# Patient Record
Sex: MALE | Race: WHITE | Employment: FULL TIME | URBAN - METROPOLITAN AREA
[De-identification: names, ages, dates, MRNs, and addresses within clinical notes are randomized per-mention and may not be internally consistent; named-entity substitution may affect disease eponyms.]

---

## 2017-04-15 DIAGNOSIS — F17.200 TOBACCO USE DISORDER: ICD-10-CM

## 2017-04-15 DIAGNOSIS — Z13.220 LIPID SCREENING: ICD-10-CM

## 2017-04-15 DIAGNOSIS — Z12.5 SPECIAL SCREENING EXAMINATION FOR NEOPLASM OF PROSTATE: ICD-10-CM

## 2017-04-15 DIAGNOSIS — Z11.4 SCREENING FOR HIV WITHOUT PRESENCE OF RISK FACTORS: ICD-10-CM

## 2017-04-15 DIAGNOSIS — N45.1 EPIDIDYMITIS: ICD-10-CM

## 2017-04-15 DIAGNOSIS — E55.9 VITAMIN D DEFICIENCY: ICD-10-CM

## 2017-04-15 LAB
ALBUMIN SERPL-MCNC: 4.3 G/DL (ref 3.9–4.9)
ALP BLD-CCNC: 46 U/L (ref 35–104)
ALT SERPL-CCNC: 23 U/L (ref 0–41)
ANION GAP SERPL CALCULATED.3IONS-SCNC: 11 MEQ/L (ref 7–13)
AST SERPL-CCNC: 20 U/L (ref 0–40)
BASOPHILS ABSOLUTE: 0.1 K/UL (ref 0–0.2)
BASOPHILS RELATIVE PERCENT: 1.1 %
BILIRUB SERPL-MCNC: 1.3 MG/DL (ref 0–1.2)
BUN BLDV-MCNC: 16 MG/DL (ref 6–20)
CALCIUM SERPL-MCNC: 9.7 MG/DL (ref 8.6–10.2)
CHLORIDE BLD-SCNC: 103 MEQ/L (ref 98–107)
CHOLESTEROL, TOTAL: 243 MG/DL (ref 0–199)
CO2: 27 MEQ/L (ref 22–29)
CREAT SERPL-MCNC: 0.69 MG/DL (ref 0.7–1.2)
EOSINOPHILS ABSOLUTE: 0.2 K/UL (ref 0–0.7)
EOSINOPHILS RELATIVE PERCENT: 3.3 %
GFR AFRICAN AMERICAN: >60
GFR NON-AFRICAN AMERICAN: >60
GLOBULIN: 2.6 G/DL (ref 2.3–3.5)
GLUCOSE BLD-MCNC: 98 MG/DL (ref 74–109)
HCT VFR BLD CALC: 45.3 % (ref 42–52)
HDLC SERPL-MCNC: 59 MG/DL (ref 40–59)
HEMOGLOBIN: 15.3 G/DL (ref 14–18)
LDL CHOLESTEROL CALCULATED: 158 MG/DL (ref 0–129)
LYMPHOCYTES ABSOLUTE: 2.4 K/UL (ref 1–4.8)
LYMPHOCYTES RELATIVE PERCENT: 33.3 %
MCH RBC QN AUTO: 31.5 PG (ref 27–31.3)
MCHC RBC AUTO-ENTMCNC: 33.8 % (ref 33–37)
MCV RBC AUTO: 93.2 FL (ref 80–100)
MONOCYTES ABSOLUTE: 0.8 K/UL (ref 0.2–0.8)
MONOCYTES RELATIVE PERCENT: 11.2 %
NEUTROPHILS ABSOLUTE: 3.8 K/UL (ref 1.4–6.5)
NEUTROPHILS RELATIVE PERCENT: 51.1 %
PDW BLD-RTO: 13.3 % (ref 11.5–14.5)
PLATELET # BLD: 291 K/UL (ref 130–400)
POTASSIUM SERPL-SCNC: 4.4 MEQ/L (ref 3.5–5.1)
PROSTATE SPECIFIC ANTIGEN: 0.94 NG/ML
RBC # BLD: 4.86 M/UL (ref 4.7–6.1)
SODIUM BLD-SCNC: 141 MEQ/L (ref 132–144)
TOTAL PROTEIN: 6.9 G/DL (ref 6.4–8.1)
TRIGL SERPL-MCNC: 128 MG/DL (ref 0–200)
TSH REFLEX: 1.56 UIU/ML (ref 0.27–4.2)
WBC # BLD: 7.3 K/UL (ref 4.8–10.8)

## 2017-04-19 LAB
HIV-1 AND HIV-2 ANTIBODIES: NEGATIVE
VITAMIN D2 AND D3, TOTAL: 33.3 NG/ML (ref 30–80)
VITAMIN D2, 25 HYDROXY: 1 NG/ML
VITAMIN D3,25 HYDROXY: 32.3 NG/ML

## 2017-04-26 ENCOUNTER — OFFICE VISIT (OUTPATIENT)
Dept: FAMILY MEDICINE CLINIC | Age: 47
End: 2017-04-26

## 2017-04-26 VITALS
OXYGEN SATURATION: 98 % | HEIGHT: 66 IN | RESPIRATION RATE: 12 BRPM | WEIGHT: 144 LBS | HEART RATE: 95 BPM | DIASTOLIC BLOOD PRESSURE: 72 MMHG | BODY MASS INDEX: 23.14 KG/M2 | TEMPERATURE: 97.3 F | SYSTOLIC BLOOD PRESSURE: 104 MMHG

## 2017-04-26 DIAGNOSIS — Z23 NEED FOR PROPHYLACTIC VACCINATION AGAINST STREPTOCOCCUS PNEUMONIAE (PNEUMOCOCCUS): ICD-10-CM

## 2017-04-26 DIAGNOSIS — F17.200 TOBACCO USE DISORDER: ICD-10-CM

## 2017-04-26 DIAGNOSIS — E78.2 MIXED HYPERLIPIDEMIA: Primary | ICD-10-CM

## 2017-04-26 DIAGNOSIS — Z12.5 SPECIAL SCREENING FOR MALIGNANT NEOPLASM OF PROSTATE: ICD-10-CM

## 2017-04-26 PROCEDURE — 90471 IMMUNIZATION ADMIN: CPT | Performed by: FAMILY MEDICINE

## 2017-04-26 PROCEDURE — 90732 PPSV23 VACC 2 YRS+ SUBQ/IM: CPT | Performed by: FAMILY MEDICINE

## 2017-04-26 PROCEDURE — 99214 OFFICE O/P EST MOD 30 MIN: CPT | Performed by: FAMILY MEDICINE

## 2017-04-26 ASSESSMENT — ENCOUNTER SYMPTOMS
EYES NEGATIVE: 1
GASTROINTESTINAL NEGATIVE: 1
ALLERGIC/IMMUNOLOGIC NEGATIVE: 1
RESPIRATORY NEGATIVE: 1

## 2020-02-25 ENCOUNTER — OFFICE VISIT (OUTPATIENT)
Dept: FAMILY MEDICINE CLINIC | Age: 50
End: 2020-02-25
Payer: COMMERCIAL

## 2020-02-25 VITALS
BODY MASS INDEX: 21.69 KG/M2 | TEMPERATURE: 98.9 F | HEIGHT: 66 IN | WEIGHT: 135 LBS | RESPIRATION RATE: 14 BRPM | OXYGEN SATURATION: 98 % | SYSTOLIC BLOOD PRESSURE: 126 MMHG | HEART RATE: 111 BPM | DIASTOLIC BLOOD PRESSURE: 80 MMHG

## 2020-02-25 LAB
INFLUENZA A ANTIBODY: NEGATIVE
INFLUENZA B ANTIBODY: NEGATIVE

## 2020-02-25 PROCEDURE — 99213 OFFICE O/P EST LOW 20 MIN: CPT | Performed by: NURSE PRACTITIONER

## 2020-02-25 PROCEDURE — 87804 INFLUENZA ASSAY W/OPTIC: CPT | Performed by: NURSE PRACTITIONER

## 2020-02-25 SDOH — ECONOMIC STABILITY: FOOD INSECURITY: WITHIN THE PAST 12 MONTHS, THE FOOD YOU BOUGHT JUST DIDN'T LAST AND YOU DIDN'T HAVE MONEY TO GET MORE.: NEVER TRUE

## 2020-02-25 SDOH — ECONOMIC STABILITY: INCOME INSECURITY: HOW HARD IS IT FOR YOU TO PAY FOR THE VERY BASICS LIKE FOOD, HOUSING, MEDICAL CARE, AND HEATING?: NOT HARD AT ALL

## 2020-02-25 SDOH — ECONOMIC STABILITY: FOOD INSECURITY: WITHIN THE PAST 12 MONTHS, YOU WORRIED THAT YOUR FOOD WOULD RUN OUT BEFORE YOU GOT MONEY TO BUY MORE.: NEVER TRUE

## 2020-02-25 SDOH — ECONOMIC STABILITY: TRANSPORTATION INSECURITY
IN THE PAST 12 MONTHS, HAS LACK OF TRANSPORTATION KEPT YOU FROM MEETINGS, WORK, OR FROM GETTING THINGS NEEDED FOR DAILY LIVING?: NO

## 2020-02-25 SDOH — ECONOMIC STABILITY: TRANSPORTATION INSECURITY
IN THE PAST 12 MONTHS, HAS THE LACK OF TRANSPORTATION KEPT YOU FROM MEDICAL APPOINTMENTS OR FROM GETTING MEDICATIONS?: NO

## 2020-02-25 ASSESSMENT — ENCOUNTER SYMPTOMS
NAUSEA: 1
COUGH: 0
SINUS PRESSURE: 0
VOMITING: 1
ABDOMINAL PAIN: 1
WHEEZING: 0
SINUS PAIN: 0
DIARRHEA: 0
CONSTIPATION: 1
RHINORRHEA: 0
SORE THROAT: 0
SHORTNESS OF BREATH: 0

## 2020-02-25 ASSESSMENT — PATIENT HEALTH QUESTIONNAIRE - PHQ9
SUM OF ALL RESPONSES TO PHQ QUESTIONS 1-9: 0
SUM OF ALL RESPONSES TO PHQ9 QUESTIONS 1 & 2: 0
SUM OF ALL RESPONSES TO PHQ QUESTIONS 1-9: 0
1. LITTLE INTEREST OR PLEASURE IN DOING THINGS: 0
2. FEELING DOWN, DEPRESSED OR HOPELESS: 0

## 2020-02-25 NOTE — PROGRESS NOTES
Subjective  Debora Gregory III, 44 y.o. male presents today with:  Chief Complaint   Patient presents with    Abdominal Pain     Patinet states it started this morning     Nausea & Vomiting    Chills       Abdominal Pain   This is a new problem. Episode onset: last night. The problem has been gradually improving. The pain is located in the LLQ, RLQ and suprapubic region. The abdominal pain does not radiate. Associated symptoms include constipation, nausea and vomiting. Pertinent negatives include no diarrhea or fever. The pain is aggravated by movement. The pain is relieved by certain positions. Treatments tried: motrin. The treatment provided no relief. Nausea & Vomiting   Associated symptoms include abdominal pain, chills, nausea and vomiting. Pertinent negatives include no chest pain, congestion, coughing, fever or sore throat. Has not vomited since 0300 AM    Last BM was yesterday and it was normal    Patient had bowl of cereal today    Tolerating drinking fluids    No past medical history on file. No Known Allergies    No current outpatient medications on file prior to visit. No current facility-administered medications on file prior to visit. Review of Systems   Constitutional: Positive for chills. Negative for fever. HENT: Negative for congestion, ear pain, postnasal drip, rhinorrhea, sinus pressure, sinus pain and sore throat. Respiratory: Negative for cough, shortness of breath and wheezing. Cardiovascular: Negative for chest pain. Gastrointestinal: Positive for abdominal pain, constipation, nausea and vomiting. Negative for diarrhea. Objective    Vitals:    02/25/20 1629   BP: 126/80   Site: Right Upper Arm   Position: Sitting   Cuff Size: Medium Adult   Pulse: 111   Resp: 14   Temp: 98.9 °F (37.2 °C)   TempSrc: Oral   SpO2: 98%   Weight: 135 lb (61.2 kg)   Height: 5' 6\" (1.676 m)       Physical Exam  Vitals signs reviewed.    Constitutional:       General: He is not in acute distress. Appearance: He is well-developed. HENT:      Head: Normocephalic and atraumatic. Right Ear: No middle ear effusion. Tympanic membrane is not erythematous or bulging. Left Ear:  No middle ear effusion. Tympanic membrane is not erythematous or bulging. Nose:      Right Sinus: No maxillary sinus tenderness or frontal sinus tenderness. Left Sinus: No maxillary sinus tenderness or frontal sinus tenderness. Mouth/Throat:      Pharynx: No oropharyngeal exudate or posterior oropharyngeal erythema. Eyes:      General:         Right eye: No discharge. Left eye: No discharge. Conjunctiva/sclera: Conjunctivae normal.   Cardiovascular:      Rate and Rhythm: Normal rate and regular rhythm. Heart sounds: Normal heart sounds. Pulmonary:      Effort: Pulmonary effort is normal. No respiratory distress. Breath sounds: Normal breath sounds. No stridor. No decreased breath sounds, wheezing or rhonchi. Abdominal:      General: Abdomen is flat. Bowel sounds are normal.      Palpations: Abdomen is soft. Tenderness: There is abdominal tenderness (mild) in the right lower quadrant, suprapubic area and left lower quadrant. Lymphadenopathy:      Cervical: No cervical adenopathy. Skin:     General: Skin is warm and dry. Neurological:      General: No focal deficit present. POC Testing Today:   Results for POC orders placed in visit on 02/25/20   POCT Influenza A/B   Result Value Ref Range    Influenza A Ab negative     Influenza B Ab negative        Assessment & Plan     Amena Ramachandran was seen today for abdominal pain, nausea & vomiting and chills. Diagnoses and all orders for this visit:    Abdominal pain, unspecified abdominal location    Flu-like symptoms  -     POCT Influenza A/B      Procedures:  Unless otherwise noted below, none    Return if symptoms worsen or fail to improve, for follow up with PCP.     Declined bentyl, states symptoms have

## 2020-03-30 LAB
ANION GAP SERPL CALCULATED.3IONS-SCNC: 10 MEQ/L (ref 9–15)
BASOPHILS ABSOLUTE: 0.1 K/UL (ref 0–0.2)
BASOPHILS RELATIVE PERCENT: 1 %
BUN BLDV-MCNC: 8 MG/DL (ref 6–20)
C-REACTIVE PROTEIN: 3.6 MG/L (ref 0–5)
CALCIUM SERPL-MCNC: 8.6 MG/DL (ref 8.5–9.9)
CHLORIDE BLD-SCNC: 100 MEQ/L (ref 95–107)
CO2: 27 MEQ/L (ref 20–31)
CREAT SERPL-MCNC: 0.66 MG/DL (ref 0.7–1.2)
EOSINOPHILS ABSOLUTE: 0.3 K/UL (ref 0–0.7)
EOSINOPHILS RELATIVE PERCENT: 3.8 %
GFR AFRICAN AMERICAN: >60
GFR NON-AFRICAN AMERICAN: >60
GLUCOSE BLD-MCNC: 123 MG/DL (ref 70–99)
HCT VFR BLD CALC: 29.3 % (ref 42–52)
HEMOGLOBIN: 9.9 G/DL (ref 14–18)
LYMPHOCYTES ABSOLUTE: 2.3 K/UL (ref 1–4.8)
LYMPHOCYTES RELATIVE PERCENT: 32.7 %
MCH RBC QN AUTO: 32.1 PG (ref 27–31.3)
MCHC RBC AUTO-ENTMCNC: 33.7 % (ref 33–37)
MCV RBC AUTO: 95.5 FL (ref 80–100)
MONOCYTES ABSOLUTE: 1 K/UL (ref 0.2–0.8)
MONOCYTES RELATIVE PERCENT: 13.6 %
NEUTROPHILS ABSOLUTE: 3.4 K/UL (ref 1.4–6.5)
NEUTROPHILS RELATIVE PERCENT: 48.9 %
PDW BLD-RTO: 14.4 % (ref 11.5–14.5)
PLATELET # BLD: 407 K/UL (ref 130–400)
POTASSIUM SERPL-SCNC: 4.1 MEQ/L (ref 3.4–4.9)
RBC # BLD: 3.07 M/UL (ref 4.7–6.1)
SODIUM BLD-SCNC: 137 MEQ/L (ref 135–144)
WBC # BLD: 7 K/UL (ref 4.8–10.8)

## 2020-04-06 ENCOUNTER — TELEPHONE (OUTPATIENT)
Dept: INFECTIOUS DISEASES | Age: 50
End: 2020-04-06

## 2020-04-16 ENCOUNTER — TELEPHONE (OUTPATIENT)
Dept: INFECTIOUS DISEASES | Age: 50
End: 2020-04-16

## 2020-04-16 NOTE — TELEPHONE ENCOUNTER
Pharmacy asking to Confirm End date of IV Abx. Pt soc was 3/20/20 for x4 wks, Found patient was suppose to be seen-have v/v yesterday 4-15-20 and R/s to 4-22-20. Will assist with an appt for tomorrow.

## 2020-04-22 ENCOUNTER — VIRTUAL VISIT (OUTPATIENT)
Dept: INFECTIOUS DISEASES | Age: 50
End: 2020-04-22
Payer: COMMERCIAL

## 2020-04-22 ENCOUNTER — TELEPHONE (OUTPATIENT)
Dept: INFECTIOUS DISEASES | Age: 50
End: 2020-04-22

## 2020-04-22 PROCEDURE — 99214 OFFICE O/P EST MOD 30 MIN: CPT | Performed by: INTERNAL MEDICINE

## 2020-04-22 RX ORDER — LACTOBACILLUS ACIDOPH-L.BULGARICUS 1 MILLION CELL CHEWABLE TABLET 1MM CELL
1 TABLET,CHEWABLE ORAL 2 TIMES DAILY
Qty: 60 TABLET | Refills: 0 | Status: SHIPPED | OUTPATIENT
Start: 2020-04-22 | End: 2020-05-22

## 2020-04-22 RX ORDER — PANTOPRAZOLE SODIUM 40 MG/1
40 TABLET, DELAYED RELEASE ORAL
Qty: 30 TABLET | Refills: 1 | Status: SHIPPED | OUTPATIENT
Start: 2020-04-22 | End: 2020-05-22

## 2020-04-22 NOTE — TELEPHONE ENCOUNTER
Humaira Zendejas asking to Confirm End of IV Abx Tx. Advised V/v completed today and orders will be placed. Will Fax over once complete.

## 2020-04-23 LAB
HCT VFR BLD CALC: 39.3 % (ref 42–52)
HEMOGLOBIN: 12.9 G/DL (ref 14–18)

## 2020-04-24 NOTE — TELEPHONE ENCOUNTER
Virtual visit completed 4-22-20 with Dr Krystian Chaudhary.    Orders to D/c IV Abx+Picc, Plus x1 Lab order Faxed to [facility].

## 2020-05-20 LAB
HCT VFR BLD CALC: 43 % (ref 41–52)
HEMOGLOBIN: 14 G/DL (ref 13.5–17)

## 2023-04-18 ENCOUNTER — TELEPHONE (OUTPATIENT)
Dept: PRIMARY CARE | Facility: CLINIC | Age: 53
End: 2023-04-18
Payer: COMMERCIAL

## 2023-04-18 DIAGNOSIS — J34.9 SINUS PROBLEM: ICD-10-CM

## 2023-07-10 DIAGNOSIS — E78.2 MIXED HYPERLIPIDEMIA: Primary | ICD-10-CM

## 2023-07-10 PROBLEM — G47.33 OBSTRUCTIVE SLEEP APNEA: Status: ACTIVE | Noted: 2023-07-10

## 2023-07-10 PROBLEM — M47.816 DJD (DEGENERATIVE JOINT DISEASE), LUMBAR: Status: ACTIVE | Noted: 2023-07-10

## 2023-07-10 PROBLEM — J32.9 CHRONIC SINUSITIS: Status: ACTIVE | Noted: 2023-07-10

## 2023-07-10 PROBLEM — K57.30 DIVERTICULAR DISEASE OF COLON: Status: ACTIVE | Noted: 2023-07-10

## 2023-07-10 PROBLEM — E78.5 HYPERLIPIDEMIA: Status: ACTIVE | Noted: 2023-07-10

## 2023-07-10 PROBLEM — N52.9 VASCULOGENIC ERECTILE DYSFUNCTION: Status: ACTIVE | Noted: 2023-07-10

## 2023-07-10 RX ORDER — ROSUVASTATIN CALCIUM 5 MG/1
1 TABLET, COATED ORAL DAILY
COMMUNITY
Start: 2020-09-14 | End: 2023-07-10 | Stop reason: SDUPTHER

## 2023-07-10 RX ORDER — ROSUVASTATIN CALCIUM 5 MG/1
5 TABLET, COATED ORAL DAILY
Qty: 30 TABLET | Refills: 0 | Status: SHIPPED | OUTPATIENT
Start: 2023-07-10

## 2023-07-28 PROBLEM — D50.9 IRON DEFICIENCY ANEMIA: Status: RESOLVED | Noted: 2023-07-28 | Resolved: 2023-07-28

## 2023-07-28 PROBLEM — M75.52 SUBACROMIAL BURSITIS OF LEFT SHOULDER JOINT: Status: RESOLVED | Noted: 2023-07-28 | Resolved: 2023-07-28

## 2023-07-28 PROBLEM — J34.2 DNS (DEVIATED NASAL SEPTUM): Status: ACTIVE | Noted: 2023-07-28

## 2023-07-28 PROBLEM — M62.838 MUSCLE SPASM: Status: ACTIVE | Noted: 2023-07-28

## 2023-07-28 PROBLEM — K56.609 SMALL BOWEL OBSTRUCTION (MULTI): Status: RESOLVED | Noted: 2023-07-28 | Resolved: 2023-07-28

## 2023-07-28 PROBLEM — K57.20 ABSCESS OF SIGMOID COLON DUE TO DIVERTICULITIS: Status: RESOLVED | Noted: 2023-07-28 | Resolved: 2023-07-28

## 2023-07-28 PROBLEM — R20.0 NUMBNESS AND TINGLING SENSATION OF SKIN: Status: RESOLVED | Noted: 2023-07-28 | Resolved: 2023-07-28

## 2023-07-28 PROBLEM — K92.9 DIGESTIVE SYSTEM DISORDER: Status: RESOLVED | Noted: 2023-07-28 | Resolved: 2023-07-28

## 2023-07-28 PROBLEM — R20.2 NUMBNESS AND TINGLING SENSATION OF SKIN: Status: RESOLVED | Noted: 2023-07-28 | Resolved: 2023-07-28

## 2023-07-28 PROBLEM — J34.89 NASAL OBSTRUCTION: Status: ACTIVE | Noted: 2023-07-28

## 2023-07-28 PROBLEM — M54.50 LOW BACK PAIN: Status: ACTIVE | Noted: 2023-07-28

## 2023-07-28 RX ORDER — DOXYCYCLINE 100 MG/1
1 CAPSULE ORAL 2 TIMES DAILY
COMMUNITY
Start: 2023-02-09 | End: 2023-08-09 | Stop reason: ALTCHOICE

## 2023-07-28 RX ORDER — ACETAMINOPHEN 500 MG
TABLET ORAL
COMMUNITY

## 2023-07-28 RX ORDER — VITAMIN B COMPLEX
1 CAPSULE ORAL DAILY
COMMUNITY
End: 2024-06-03 | Stop reason: ALTCHOICE

## 2023-07-28 RX ORDER — IBUPROFEN 800 MG/1
1 TABLET ORAL EVERY 8 HOURS PRN
COMMUNITY
Start: 2021-05-13

## 2023-07-28 RX ORDER — ACETAMINOPHEN 500 MG
TABLET ORAL
COMMUNITY
Start: 2020-08-12 | End: 2024-06-03 | Stop reason: ALTCHOICE

## 2023-07-28 RX ORDER — CYCLOBENZAPRINE HCL 10 MG
1 TABLET ORAL 3 TIMES DAILY PRN
COMMUNITY
Start: 2021-10-21 | End: 2024-06-03 | Stop reason: ALTCHOICE

## 2023-08-09 ENCOUNTER — OFFICE VISIT (OUTPATIENT)
Dept: PRIMARY CARE | Facility: CLINIC | Age: 53
End: 2023-08-09
Payer: COMMERCIAL

## 2023-08-09 VITALS
DIASTOLIC BLOOD PRESSURE: 83 MMHG | BODY MASS INDEX: 26.33 KG/M2 | HEIGHT: 66 IN | WEIGHT: 163.8 LBS | TEMPERATURE: 98.1 F | RESPIRATION RATE: 16 BRPM | SYSTOLIC BLOOD PRESSURE: 123 MMHG | HEART RATE: 88 BPM | OXYGEN SATURATION: 96 %

## 2023-08-09 DIAGNOSIS — K40.90 NON-RECURRENT UNILATERAL INGUINAL HERNIA WITHOUT OBSTRUCTION OR GANGRENE: ICD-10-CM

## 2023-08-09 DIAGNOSIS — M79.89 MASS OF SOFT TISSUE OF HIP: Primary | ICD-10-CM

## 2023-08-09 DIAGNOSIS — J32.9 CHRONIC SINUSITIS, UNSPECIFIED LOCATION: ICD-10-CM

## 2023-08-09 PROCEDURE — 99214 OFFICE O/P EST MOD 30 MIN: CPT | Performed by: FAMILY MEDICINE

## 2023-08-09 PROCEDURE — 1036F TOBACCO NON-USER: CPT | Performed by: FAMILY MEDICINE

## 2023-08-09 RX ORDER — CLARITHROMYCIN 500 MG/1
500 TABLET, FILM COATED ORAL 2 TIMES DAILY
Qty: 28 TABLET | Refills: 0 | Status: SHIPPED | OUTPATIENT
Start: 2023-08-09 | End: 2023-08-23

## 2023-08-09 ASSESSMENT — ANXIETY QUESTIONNAIRES
4. TROUBLE RELAXING: NOT AT ALL
1. FEELING NERVOUS, ANXIOUS, OR ON EDGE: NOT AT ALL
2. NOT BEING ABLE TO STOP OR CONTROL WORRYING: NOT AT ALL
5. BEING SO RESTLESS THAT IT IS HARD TO SIT STILL: NOT AT ALL
GAD7 TOTAL SCORE: 3
IF YOU CHECKED OFF ANY PROBLEMS ON THIS QUESTIONNAIRE, HOW DIFFICULT HAVE THESE PROBLEMS MADE IT FOR YOU TO DO YOUR WORK, TAKE CARE OF THINGS AT HOME, OR GET ALONG WITH OTHER PEOPLE: NOT DIFFICULT AT ALL
6. BECOMING EASILY ANNOYED OR IRRITABLE: SEVERAL DAYS
3. WORRYING TOO MUCH ABOUT DIFFERENT THINGS: SEVERAL DAYS
7. FEELING AFRAID AS IF SOMETHING AWFUL MIGHT HAPPEN: SEVERAL DAYS

## 2023-08-09 ASSESSMENT — ENCOUNTER SYMPTOMS
SEIZURES: 0
DYSURIA: 0
COUGH: 0
SPEECH DIFFICULTY: 0
HEARTBURN: 1
BACK PAIN: 0
EYE ITCHING: 0
UNEXPECTED WEIGHT CHANGE: 0
HEMATURIA: 0
WHEEZING: 0
AGITATION: 0
CONFUSION: 0
POLYDIPSIA: 0
TREMORS: 0
BRUISES/BLEEDS EASILY: 0
CHOKING: 0
FLANK PAIN: 0
ABDOMINAL PAIN: 0
NAUSEA: 0
NERVOUS/ANXIOUS: 0
WOUND: 0
WEAKNESS: 0
FEVER: 0
SINUS PRESSURE: 1
APPETITE CHANGE: 0
SORE THROAT: 0
RHINORRHEA: 1
JOINT SWELLING: 0
ACTIVITY CHANGE: 0
DIZZINESS: 0
ARTHRALGIAS: 0
EYE PAIN: 0
CONSTIPATION: 0
EYE DISCHARGE: 0
VOMITING: 0
SINUS PAIN: 0
EYE REDNESS: 0
NUMBNESS: 0
DIARRHEA: 0
VOICE CHANGE: 0
CHEST TIGHTNESS: 0
NECK STIFFNESS: 0
HEADACHES: 1
PHOTOPHOBIA: 0
PALPITATIONS: 0
HALLUCINATIONS: 0
SLEEP DISTURBANCE: 0
ABDOMINAL DISTENTION: 0
MYALGIAS: 0
FATIGUE: 1
SHORTNESS OF BREATH: 0
LIGHT-HEADEDNESS: 0
FACIAL ASYMMETRY: 0
TROUBLE SWALLOWING: 0
DIAPHORESIS: 0
NECK PAIN: 0
BLOOD IN STOOL: 0
ADENOPATHY: 0
DYSPHORIC MOOD: 0
FREQUENCY: 0
CHILLS: 0

## 2023-08-09 ASSESSMENT — PATIENT HEALTH QUESTIONNAIRE - PHQ9
SUM OF ALL RESPONSES TO PHQ9 QUESTIONS 1 AND 2: 0
2. FEELING DOWN, DEPRESSED OR HOPELESS: NOT AT ALL
1. LITTLE INTEREST OR PLEASURE IN DOING THINGS: NOT AT ALL

## 2023-08-09 NOTE — PROGRESS NOTES
Subjective   Patient ID: Edgar Covarrubias is a 53 y.o. male who presents for Headache (Been having headaches for 3-4 months its gotten worse. He states his head at been hot then normal. Been taking 800 mg ibuprofen and Excedrin migraine   ) and Heartburn (Been going on and off for a couple months he has taken tums and nothing has helped. ).  Headache   Associated symptoms include rhinorrhea and sinus pressure. Pertinent negatives include no abdominal pain, back pain, coughing, dizziness, ear pain, eye pain, eye redness, fever, hearing loss, nausea, neck pain, numbness, photophobia, seizures, sore throat, tinnitus, vomiting or weakness.   Heartburn  He complains of heartburn. He reports no abdominal pain, no chest pain, no choking, no coughing, no nausea, no sore throat or no wheezing. Associated symptoms include fatigue.       Review of Systems   Constitutional:  Positive for fatigue. Negative for activity change, appetite change, chills, diaphoresis, fever and unexpected weight change.   HENT:  Positive for congestion, postnasal drip, rhinorrhea and sinus pressure. Negative for ear pain, hearing loss, nosebleeds, sinus pain, sneezing, sore throat, tinnitus, trouble swallowing and voice change.    Eyes:  Negative for photophobia, pain, discharge, redness, itching and visual disturbance.   Respiratory:  Negative for cough, choking, chest tightness, shortness of breath and wheezing.    Cardiovascular:  Negative for chest pain, palpitations and leg swelling.   Gastrointestinal:  Positive for heartburn. Negative for abdominal distention, abdominal pain, blood in stool, constipation, diarrhea, nausea and vomiting.   Endocrine: Negative for cold intolerance, heat intolerance, polydipsia and polyuria.   Genitourinary:  Positive for testicular pain. Negative for dysuria, flank pain, frequency, hematuria and urgency.   Musculoskeletal:  Negative for arthralgias, back pain, joint swelling, myalgias, neck pain and neck  "stiffness.   Skin:  Negative for rash and wound.   Allergic/Immunologic: Negative for immunocompromised state.   Neurological:  Positive for headaches. Negative for dizziness, tremors, seizures, syncope, facial asymmetry, speech difficulty, weakness, light-headedness and numbness.   Hematological:  Negative for adenopathy. Does not bruise/bleed easily.   Psychiatric/Behavioral:  Negative for agitation, behavioral problems, confusion, dysphoric mood, hallucinations, self-injury, sleep disturbance and suicidal ideas. The patient is not nervous/anxious.        Objective   /83 (BP Location: Right arm, Patient Position: Sitting, BP Cuff Size: Adult)   Pulse 88   Temp 36.7 °C (98.1 °F) (Temporal)   Resp 16   Ht 1.684 m (5' 6.3\")   Wt 74.3 kg (163 lb 12.8 oz)   SpO2 96%   BMI 26.20 kg/m²   Physical Exam  Constitutional:       General: He is not in acute distress.     Appearance: He is not ill-appearing or diaphoretic.   HENT:      Head: Normocephalic and atraumatic.      Right Ear: External ear normal. A middle ear effusion is present. Tympanic membrane is not perforated, erythematous, retracted or bulging.      Left Ear: External ear normal. A middle ear effusion is present. Tympanic membrane is not perforated, erythematous, retracted or bulging.      Nose: Mucosal edema, congestion and rhinorrhea present. No nasal tenderness.      Right Turbinates: Enlarged and swollen.      Left Turbinates: Enlarged.      Right Sinus: Frontal sinus tenderness present.      Left Sinus: Frontal sinus tenderness present.      Mouth/Throat:      Pharynx: Posterior oropharyngeal erythema and uvula swelling present.   Eyes:      General: Lids are normal. No scleral icterus.        Right eye: No discharge.         Left eye: No discharge.      Conjunctiva/sclera: Conjunctivae normal.   Cardiovascular:      Rate and Rhythm: Normal rate and regular rhythm.      Pulses: Normal pulses.      Heart sounds: No murmur heard.  Pulmonary:    "   Effort: Pulmonary effort is normal. No respiratory distress.      Breath sounds: No decreased breath sounds, wheezing, rhonchi or rales.   Abdominal:      General: Bowel sounds are normal. There is no distension.      Palpations: Abdomen is soft. There is no mass.      Tenderness: There is no abdominal tenderness. There is no guarding or rebound.      Hernia: A hernia is present. Hernia is present in the right inguinal area.   Genitourinary:     Penis: Normal and circumcised.       Testes:         Right: Tenderness present.         Left: Tenderness present.      Epididymis:      Right: Not enlarged. No tenderness.      Left: Enlarged. Tenderness present.          Comments: Soft tissue mass left hip  Musculoskeletal:         General: No swelling, tenderness or deformity.      Cervical back: No rigidity or tenderness.      Right lower leg: No edema.      Left lower leg: No edema.   Lymphadenopathy:      Cervical: No cervical adenopathy.      Upper Body:      Right upper body: No supraclavicular adenopathy.      Left upper body: No supraclavicular adenopathy.   Skin:     General: Skin is warm and dry.      Coloration: Skin is not jaundiced or pale.      Findings: No erythema, lesion or rash.   Neurological:      General: No focal deficit present.      Mental Status: He is alert and oriented to person, place, and time.      Sensory: No sensory deficit.      Motor: No weakness or tremor.      Coordination: Coordination normal.      Gait: Gait normal.   Psychiatric:         Mood and Affect: Mood normal. Affect is not inappropriate.         Behavior: Behavior normal.         Assessment/Plan   Problem List Items Addressed This Visit       Chronic sinusitis    Relevant Medications    clarithromycin (Biaxin) 500 mg tablet     Other Visit Diagnoses       Mass of soft tissue of hip    -  Primary    Relevant Orders    Referral to General Surgery    Non-recurrent unilateral inguinal hernia without obstruction or gangrene         Relevant Orders    Referral to General Surgery        Recommend sinus rinse at least twice daily.  Once symptoms have resolved would recommend start OTC Flonase 2 puffs each nostril daily long-term for preventative care.

## 2024-06-03 ENCOUNTER — LAB (OUTPATIENT)
Dept: LAB | Facility: LAB | Age: 54
End: 2024-06-03
Payer: COMMERCIAL

## 2024-06-03 ENCOUNTER — HOSPITAL ENCOUNTER (OUTPATIENT)
Dept: RADIOLOGY | Facility: HOSPITAL | Age: 54
Discharge: HOME | End: 2024-06-03
Payer: COMMERCIAL

## 2024-06-03 ENCOUNTER — OFFICE VISIT (OUTPATIENT)
Dept: PRIMARY CARE | Facility: CLINIC | Age: 54
End: 2024-06-03
Payer: COMMERCIAL

## 2024-06-03 VITALS
TEMPERATURE: 97.7 F | HEART RATE: 87 BPM | SYSTOLIC BLOOD PRESSURE: 135 MMHG | HEIGHT: 66 IN | OXYGEN SATURATION: 96 % | RESPIRATION RATE: 18 BRPM | BODY MASS INDEX: 26.52 KG/M2 | WEIGHT: 165 LBS | DIASTOLIC BLOOD PRESSURE: 87 MMHG

## 2024-06-03 DIAGNOSIS — R06.01 ORTHOPNEA: ICD-10-CM

## 2024-06-03 DIAGNOSIS — Z11.59 ENCOUNTER FOR HEPATITIS C SCREENING TEST FOR LOW RISK PATIENT: ICD-10-CM

## 2024-06-03 DIAGNOSIS — R40.0 DAYTIME SOMNOLENCE: ICD-10-CM

## 2024-06-03 DIAGNOSIS — Z11.4 SCREENING FOR HUMAN IMMUNODEFICIENCY VIRUS WITHOUT PRESENCE OF RISK FACTORS: ICD-10-CM

## 2024-06-03 DIAGNOSIS — Z11.4 SCREENING FOR HUMAN IMMUNODEFICIENCY VIRUS WITHOUT PRESENCE OF RISK FACTORS: Primary | ICD-10-CM

## 2024-06-03 LAB
ALBUMIN SERPL BCP-MCNC: 4.4 G/DL (ref 3.4–5)
ALP SERPL-CCNC: 33 U/L (ref 33–120)
ALT SERPL W P-5'-P-CCNC: 21 U/L (ref 10–52)
ANION GAP SERPL CALC-SCNC: 10 MMOL/L (ref 10–20)
AST SERPL W P-5'-P-CCNC: 17 U/L (ref 9–39)
BASOPHILS # BLD AUTO: 0.07 X10*3/UL (ref 0–0.1)
BASOPHILS NFR BLD AUTO: 1.2 %
BILIRUB SERPL-MCNC: 0.8 MG/DL (ref 0–1.2)
BUN SERPL-MCNC: 21 MG/DL (ref 6–23)
CALCIUM SERPL-MCNC: 9.1 MG/DL (ref 8.6–10.3)
CHLORIDE SERPL-SCNC: 101 MMOL/L (ref 98–107)
CO2 SERPL-SCNC: 31 MMOL/L (ref 21–32)
CREAT SERPL-MCNC: 0.94 MG/DL (ref 0.5–1.3)
EGFRCR SERPLBLD CKD-EPI 2021: >90 ML/MIN/1.73M*2
EOSINOPHIL # BLD AUTO: 0.11 X10*3/UL (ref 0–0.7)
EOSINOPHIL NFR BLD AUTO: 1.9 %
ERYTHROCYTE [DISTWIDTH] IN BLOOD BY AUTOMATED COUNT: 12.5 % (ref 11.5–14.5)
GLUCOSE SERPL-MCNC: 113 MG/DL (ref 74–99)
HCT VFR BLD AUTO: 45.5 % (ref 41–52)
HGB BLD-MCNC: 14.8 G/DL (ref 13.5–17.5)
IMM GRANULOCYTES # BLD AUTO: 0.02 X10*3/UL (ref 0–0.7)
IMM GRANULOCYTES NFR BLD AUTO: 0.3 % (ref 0–0.9)
LYMPHOCYTES # BLD AUTO: 2.19 X10*3/UL (ref 1.2–4.8)
LYMPHOCYTES NFR BLD AUTO: 36.9 %
MAGNESIUM SERPL-MCNC: 1.76 MG/DL (ref 1.6–2.4)
MCH RBC QN AUTO: 30.6 PG (ref 26–34)
MCHC RBC AUTO-ENTMCNC: 32.5 G/DL (ref 32–36)
MCV RBC AUTO: 94 FL (ref 80–100)
MONOCYTES # BLD AUTO: 0.61 X10*3/UL (ref 0.1–1)
MONOCYTES NFR BLD AUTO: 10.3 %
NEUTROPHILS # BLD AUTO: 2.94 X10*3/UL (ref 1.2–7.7)
NEUTROPHILS NFR BLD AUTO: 49.4 %
NRBC BLD-RTO: 0 /100 WBCS (ref 0–0)
PLATELET # BLD AUTO: 310 X10*3/UL (ref 150–450)
POTASSIUM SERPL-SCNC: 4.1 MMOL/L (ref 3.5–5.3)
PROT SERPL-MCNC: 6.9 G/DL (ref 6.4–8.2)
RBC # BLD AUTO: 4.83 X10*6/UL (ref 4.5–5.9)
SODIUM SERPL-SCNC: 138 MMOL/L (ref 136–145)
WBC # BLD AUTO: 5.9 X10*3/UL (ref 4.4–11.3)

## 2024-06-03 PROCEDURE — 83735 ASSAY OF MAGNESIUM: CPT

## 2024-06-03 PROCEDURE — 86803 HEPATITIS C AB TEST: CPT

## 2024-06-03 PROCEDURE — 87389 HIV-1 AG W/HIV-1&-2 AB AG IA: CPT

## 2024-06-03 PROCEDURE — 93000 ELECTROCARDIOGRAM COMPLETE: CPT | Performed by: FAMILY MEDICINE

## 2024-06-03 PROCEDURE — 71046 X-RAY EXAM CHEST 2 VIEWS: CPT | Performed by: RADIOLOGY

## 2024-06-03 PROCEDURE — 36415 COLL VENOUS BLD VENIPUNCTURE: CPT

## 2024-06-03 PROCEDURE — 99213 OFFICE O/P EST LOW 20 MIN: CPT | Performed by: FAMILY MEDICINE

## 2024-06-03 PROCEDURE — 80053 COMPREHEN METABOLIC PANEL: CPT

## 2024-06-03 PROCEDURE — 83880 ASSAY OF NATRIURETIC PEPTIDE: CPT

## 2024-06-03 PROCEDURE — 85025 COMPLETE CBC W/AUTO DIFF WBC: CPT

## 2024-06-03 PROCEDURE — 71046 X-RAY EXAM CHEST 2 VIEWS: CPT

## 2024-06-03 PROCEDURE — 1036F TOBACCO NON-USER: CPT | Performed by: FAMILY MEDICINE

## 2024-06-03 ASSESSMENT — ENCOUNTER SYMPTOMS
BACK PAIN: 0
HEADACHES: 0
TROUBLE SWALLOWING: 0
CHEST TIGHTNESS: 0
ORTHOPNEA: 1
HALLUCINATIONS: 0
COUGH: 0
HEMATURIA: 0
NERVOUS/ANXIOUS: 0
ABDOMINAL PAIN: 0
SPEECH DIFFICULTY: 0
SEIZURES: 0
NECK PAIN: 0
EYE ITCHING: 0
NAUSEA: 0
WHEEZING: 0
DYSPHORIC MOOD: 0
FACIAL ASYMMETRY: 0
APPETITE CHANGE: 0
CONFUSION: 0
NECK STIFFNESS: 0
PHOTOPHOBIA: 0
SORE THROAT: 0
LIGHT-HEADEDNESS: 0
EYE REDNESS: 0
JOINT SWELLING: 0
ADENOPATHY: 0
CONSTIPATION: 0
VOMITING: 0
CHOKING: 0
DIARRHEA: 0
TREMORS: 0
UNEXPECTED WEIGHT CHANGE: 0
SHORTNESS OF BREATH: 1
AGITATION: 0
WEAKNESS: 0
ABDOMINAL DISTENTION: 0
BLOOD IN STOOL: 0
FREQUENCY: 0
BRUISES/BLEEDS EASILY: 0
FATIGUE: 0
DIZZINESS: 0
ACTIVITY CHANGE: 0
FLANK PAIN: 0
NUMBNESS: 0
DIAPHORESIS: 0
POLYDIPSIA: 0
RHINORRHEA: 1
PND: 1
CHILLS: 0
EYE DISCHARGE: 0
FEVER: 0
EYE PAIN: 0
ARTHRALGIAS: 0
PALPITATIONS: 0
DYSURIA: 0
SLEEP DISTURBANCE: 0
WOUND: 0
VOICE CHANGE: 0
SINUS PRESSURE: 0
MYALGIAS: 0

## 2024-06-03 ASSESSMENT — COPD QUESTIONNAIRES: COPD: 0

## 2024-06-03 NOTE — PROGRESS NOTES
"Subjective   Patient ID: Edgar Covarrubias is a 53 y.o. male who presents for Shortness of Breath (Per pt. \"Unable to get a good enough breath\") and Fatigue.    Patient comes into the office today complaining of worsening shortness of breath, gasping for air at night it has been going on for over a year per patient but it has just been getting gradually worse over the last several months and he feels now he needs to get something done about it.  He does have chronic upper respiratory symptomatology he has seen ENT for it and he states that upper respiratory symptomatology is also getting worse along with symptoms and it seems to be worse at night when lying down better with sitting up.  He denies any issues with nausea, vomiting, diarrhea, fever, chills, shortness of breath with exertion or chest pain.    Shortness of Breath  This is a chronic problem. The current episode started more than 1 year ago. The problem occurs constantly. The problem has been gradually worsening. Associated symptoms include orthopnea, PND and rhinorrhea. Pertinent negatives include no abdominal pain, chest pain, ear pain, fever, headaches, leg swelling, neck pain, rash, sore throat, vomiting or wheezing. Nothing aggravates the symptoms. The patient has no known risk factors for DVT/PE. He has tried body position changes for the symptoms. The treatment provided mild relief. His past medical history is significant for allergies. There is no history of aspirin allergies, asthma, bronchiolitis, CAD, chronic lung disease, COPD, DVT, a heart failure, PE, pneumonia or a recent surgery.        Review of Systems   Constitutional:  Negative for activity change, appetite change, chills, diaphoresis, fatigue, fever and unexpected weight change.   HENT:  Positive for rhinorrhea. Negative for congestion, ear pain, hearing loss, nosebleeds, postnasal drip, sinus pressure, sneezing, sore throat, tinnitus, trouble swallowing and voice change.    Eyes:  " "Negative for photophobia, pain, discharge, redness, itching and visual disturbance.   Respiratory:  Positive for shortness of breath. Negative for cough, choking, chest tightness and wheezing.    Cardiovascular:  Positive for orthopnea and PND. Negative for chest pain, palpitations and leg swelling.   Gastrointestinal:  Negative for abdominal distention, abdominal pain, blood in stool, constipation, diarrhea, nausea and vomiting.   Endocrine: Negative for cold intolerance, heat intolerance, polydipsia and polyuria.   Genitourinary:  Negative for dysuria, flank pain, frequency, hematuria and urgency.   Musculoskeletal:  Negative for arthralgias, back pain, joint swelling, myalgias, neck pain and neck stiffness.   Skin:  Negative for rash and wound.   Allergic/Immunologic: Negative for immunocompromised state.   Neurological:  Negative for dizziness, tremors, seizures, syncope, facial asymmetry, speech difficulty, weakness, light-headedness, numbness and headaches.   Hematological:  Negative for adenopathy. Does not bruise/bleed easily.   Psychiatric/Behavioral:  Negative for agitation, behavioral problems, confusion, dysphoric mood, hallucinations, self-injury, sleep disturbance and suicidal ideas. The patient is not nervous/anxious.        Objective   /87 (BP Location: Right arm, Patient Position: Sitting, BP Cuff Size: Large adult)   Pulse 87   Temp 36.5 °C (97.7 °F) (Temporal)   Resp 18   Ht 1.676 m (5' 6\")   Wt 74.8 kg (165 lb)   SpO2 96%   BMI 26.63 kg/m²     Physical Exam  Constitutional:       General: He is not in acute distress.     Appearance: He is not ill-appearing or diaphoretic.   HENT:      Head: Normocephalic and atraumatic.      Right Ear: External ear normal.      Left Ear: External ear normal.      Nose: Nose normal. No rhinorrhea.   Eyes:      General: Lids are normal. No scleral icterus.        Right eye: No discharge.         Left eye: No discharge.      Conjunctiva/sclera: " Conjunctivae normal.   Cardiovascular:      Rate and Rhythm: Normal rate and regular rhythm.      Pulses: Normal pulses.      Heart sounds: No murmur heard.  Pulmonary:      Effort: Pulmonary effort is normal. No respiratory distress.      Breath sounds: No decreased breath sounds, wheezing, rhonchi or rales.   Abdominal:      General: Bowel sounds are normal. There is no distension.      Palpations: Abdomen is soft. There is no mass.      Tenderness: There is no abdominal tenderness. There is no guarding or rebound.   Musculoskeletal:         General: No swelling, tenderness or deformity.      Cervical back: No rigidity or tenderness.      Right lower leg: No edema.      Left lower leg: No edema.   Lymphadenopathy:      Cervical: No cervical adenopathy.      Upper Body:      Right upper body: No supraclavicular adenopathy.      Left upper body: No supraclavicular adenopathy.   Skin:     General: Skin is warm and dry.      Coloration: Skin is not jaundiced or pale.      Findings: No erythema, lesion or rash.   Neurological:      General: No focal deficit present.      Mental Status: He is alert and oriented to person, place, and time.      Sensory: No sensory deficit.      Motor: No weakness or tremor.      Coordination: Coordination normal.      Gait: Gait normal.   Psychiatric:         Mood and Affect: Mood normal. Affect is not inappropriate.         Behavior: Behavior normal.         Assessment/Plan   Diagnoses and all orders for this visit:  Screening for human immunodeficiency virus without presence of risk factors  -     Hepatitis C antibody; Future  Encounter for hepatitis C screening test for low risk patient  -     HIV 1/2 Antigen/Antibody Screen with Reflex to Confirmation; Future  Orthopnea  -     XR chest 2 views; Future  -     ECG 12 Lead  -     B-type natriuretic peptide; Future  -     CBC and Auto Differential; Future  -     Comprehensive Metabolic Panel; Future  -     Magnesium; Future  Daytime  somnolence  -     Home sleep apnea test (HSAT); Future  -     B-type natriuretic peptide; Future  -     CBC and Auto Differential; Future  -     Comprehensive Metabolic Panel; Future  -     Magnesium; Future      Symptomatology not likely heart failure much more likely sleep apnea or upper respiratory symptomatology that is just worsening seasonally i.e. allergies.  Will check basic chest x-ray, BNP and labs today and we will arrange for home sleep study.  Will likely need sleep specialty.  If symptomatology worsens if he develops hypoxia symptomatology worsens in severity or frequency then recommend ER.  Patient stated understanding agreement with.

## 2024-06-04 LAB
BNP SERPL-MCNC: 10 PG/ML (ref 0–99)
HCV AB SER QL: NONREACTIVE
HIV 1+2 AB+HIV1 P24 AG SERPL QL IA: NONREACTIVE

## 2024-06-17 ENCOUNTER — CLINICAL SUPPORT (OUTPATIENT)
Dept: SLEEP MEDICINE | Facility: HOSPITAL | Age: 54
End: 2024-06-17
Payer: COMMERCIAL

## 2024-06-17 DIAGNOSIS — G47.33 OBSTRUCTIVE SLEEP APNEA (ADULT) (PEDIATRIC): ICD-10-CM

## 2024-06-17 DIAGNOSIS — R40.0 DAYTIME SOMNOLENCE: ICD-10-CM

## 2024-06-17 PROCEDURE — 95806 SLEEP STUDY UNATT&RESP EFFT: CPT | Performed by: PSYCHIATRY & NEUROLOGY

## 2024-06-17 NOTE — PROGRESS NOTES
Type of Study: HOME SLEEP STUDY - NOMAD     The patient received equipment and instructions for use of the [facility] HSAT 2020 device. The patient was instructed how to apply the effort belts, cannula, thermistor. It was also explained how the Nomad and oximeter components work.  The patient was asked to record their sleep for an 8-hour period.     The patient was informed of their responsibility for the device and acknowledged this by signing the HSAT device contract. The patient was asked to return the device on 6/18/2024 between the hours of 9am to the [facility].     The patient was instructed to call 911 as usual for any medical- emergencies while at home.  The patient was also given a phone number for troubleshooting when using the device in case there were additional questions.

## 2024-06-24 DIAGNOSIS — G47.33 OBSTRUCTIVE SLEEP APNEA: ICD-10-CM

## 2024-07-08 NOTE — PROGRESS NOTES
Patient: Edgar Covarrubias  : 1970 AGE: 53 y.o. SEX:male   MRN: [id number]   Provider: ALISSON Miller     Location [facility]   Service Date: 2024     PCP: Gt Harris DO   Referred by: Chiquis Bolton, *          [facility]  New Visit Note      HISTORY OF PRESENT ILLNESS     Edgar Covarrubias is a 53 y.o. male with a h/o KALYAH and deviated septum, HLD  who presents to [facility].    2024 : NPV with concerns of KAYLAH management, found on recent sleep study. Reports loud snoring, witnessed apneas, night time awakenings, un refreshed sleep. ----> APAP 5-15 CWP ordered via [facility].    SLEEP STUDY HISTORY (personally reviewed raw data such as interpretation report, data sheet, hypnogram, and titration table if available and applicable)  -HSAT 24: showing severe KAYLAH with MARTA 39, DANIAL 12, SpO2 ariana 78% SpO2 <=88% for 34 min    SLEEP-WAKE SCHEDULE    Sleep Patterns: He does have a usual bed partner. In terms of the patient's sleep/wake cycle, he generally gets into bed at approximately 10:30 PM.  He turns off light to sleep, and his latency to sleep onset after lights out is 40min. During the night, the patient generally awakens 1-3 times nightly. These awakenings are usually brief in duration. Final wake time on weekday mornings is around 7:15 AM.    Compared to weekdays (work week), the patient's sleep schedule is  different on the weekends (off work). Usual bed time on the weekend is around 1-2 AM. Final wake time on weekend mornings is around 9 AM.     Breathing during sleep: snoring, witnessed apneas, and nasal congestion  Behaviors at night: No   Sleep paralysis: No   Hypnogogic or hypnopompic hallucinations: No   Cataplexy: No     Leg symptoms and timing:  - Sensations: Patient does not have unusual sensations in their extremities that cause an urge to move them   - Movement: Patient has not been told that  their legs kick or jerk during sleep    Daytime Symptoms:  On awakening patient reports: wake unrefreshed, feels sleepy, rare morning headaches, sometimes morning dry mouth, and hard to get out of bed  Patient report some daytime symptoms including: DAYTIME SYMPTOMS: reports sleep inertia    Sleep environment:  Preferred sleep position: back  Room is dark: Yes  Room is quiet: Yes  Room is cool: Yes  Bed comfort: good    SLEEP HABITS  Caffeine consumption: Yes, 1-2 cups/day  Alcohol consumption: Yes, occasional- drinks bourbon on the weekends  Smoking: No  Marijuana: No  Sleep aids: denies     WEIGHT: gained 30lbs in 4 years      ESS: 2  SIERRA: 7    REVIEW OF SYSTEMS     All other systems have been reviewed and are negative.    ALLERGIES     Allergies   Allergen Reactions    Bee Pollen Runny nose       MEDICATIONS     Current Outpatient Medications   Medication Sig Dispense Refill    acetaminophen (Tylenol) 500 mg tablet Take by mouth.      aspirin-acetaminophen-caffeine (Excedrin Migraine) 250-250-65 mg tablet Take 1 tablet by mouth every 6 hours if needed for headaches.      ibuprofen 800 mg tablet Take 1 tablet (800 mg) by mouth every 8 hours if needed.      rosuvastatin (Crestor) 5 mg tablet Take 1 tablet (5 mg) by mouth once daily. (Patient not taking: Reported on 6/3/2024) 30 tablet 0     No current facility-administered medications for this visit.       PAST HISTORIES     PERTINENT PAST MEDICAL HISTORY: See HPI    PERTINENT PAST SURGICAL HISTORY for Sleep Medicine:  Nose surgery when young     PERTINENT FAMILY HISTORY for Sleep Medicine:  sleep apnea on PAP- father    PERTINENT SOCIAL HISTORY:  He  reports that he has quit smoking. His smoking use included cigarettes. He has never used smokeless tobacco. He reports current alcohol use. He reports that he does not use drugs. He currently lives with spouse.    Active Problems, Allergy List, Medication List, and PMH/PSH/FH/Social Hx have been reviewed and  "reconciled in chart. No significant changes unless documented in the pertinent chart section. Updates made when necessary.     PHYSICAL EXAM     VITAL SIGNS: /89   Pulse 86   Temp 36.7 °C (98.1 °F) (Temporal)   Ht 1.676 m (5' 6\")   Wt 72.6 kg (160 lb)   SpO2 96%   BMI 25.82 kg/m²     CURRENT WEIGHT:   Vitals:    07/16/24 0943   Weight: 72.6 kg (160 lb)      PREVIOUS WEIGHTS:  Wt Readings from Last 3 Encounters:   07/16/24 72.6 kg (160 lb)   06/03/24 74.8 kg (165 lb)   09/14/23 72.6 kg (160 lb)     Physical Exam  Constitutional: Awake, not in distress  Lungs: Clear to auscultation bilateral, no cough noted  Heart: Regular rate and rhythm  Skin: Warm, no rash  Neuro: No tremors, moves all extremities  Psych: alert and oriented to time, place, and person    HEENT:   Tonsils enlargement grade 1+   Airway comments: narrow lateral walls   Tongue scalloping: no   Modified Mallampati score - 2-3    RESULTS/DATA     No results found for: \"IRON\", \"TRANSFERRIN\", \"IRONSAT\", \"TIBC\", \"FERRITIN\"    Bicarbonate   Date Value Ref Range Status   06/03/2024 31 21 - 32 mmol/L Final       ASSESSMENT/PLAN     Mr. Covarrubias is a 53 y.o. male and He was referred to the [facility] for evaluation of KAYLAH    Problem List, Orders, Assessment, Recommendations:    # KAYLAH, severe  - HSAT 6/17/24: showing severe KAYLAH with MARTA 39, DANIAL 12, SpO2 ariana 78% SpO2 <=88% for 34 min  - will start APAP 5-15 cwp via DME- [facility] (please expedite set up due to KAYLAH severity)   - Recommend starting with nasal or NP interface, may add chin strap or mouth tape   -Sleep apnea, PAP therapy education as well as the tips to be successful with PAP treatment was provided at length in clinic today. Patient verbalized understanding.  - Discussed 30-day mask guarantee and insurance requirement regarding PAP compliance and follow-up.   -Diet, exercise, and weight loss were emphasized today in clinic, as were non-supine sleep, avoiding " alcohol in the late evening, and driving or operating heavy machinery when sleepy.   - patient will follow-up in 2-3 months and bring equipment to the follow-up clinic      #HTN  BP Readings from Last 1 Encounters:   07/16/24 149/89     - doing well, asymptomatic, denies any headache, blurry vision, chest pain, palpitation, dizziness, lightheadedness, or syncopal episodes  - discussed at length the impact of untreated KAYLAH and BP control  - supportive management: low salt DASH diet (less than 2000 mg sodium intake daily), moderate intensity aerobic exercise at least 30 minutes 5 days per week, reduce stress, quit smoking, limit alcohol, lose weight, and monitor BP once daily  - continue current management and follow-up with PCP     #Overweight  BMI Readings from Last 1 Encounters:   07/16/24 25.82 kg/m²     - Encouraged healthy weight loss via diet and exercise  - Weight loss can help in the long term treatment of KAYLAH.  - Defer management to PCP     All of patient's questions were answered. He verbalizes understanding and agreement with my assessment and plan.    Disposition    Return to clinic in 3 months

## 2024-07-12 PROBLEM — G47.33 OBSTRUCTIVE SLEEP APNEA: Status: RESOLVED | Noted: 2023-07-10 | Resolved: 2024-07-12

## 2024-07-12 PROBLEM — G47.33 OSA (OBSTRUCTIVE SLEEP APNEA): Status: ACTIVE | Noted: 2024-07-12

## 2024-07-16 ENCOUNTER — TELEPHONE (OUTPATIENT)
Dept: SLEEP MEDICINE | Facility: CLINIC | Age: 54
End: 2024-07-16

## 2024-07-16 ENCOUNTER — APPOINTMENT (OUTPATIENT)
Dept: SLEEP MEDICINE | Facility: CLINIC | Age: 54
End: 2024-07-16
Payer: COMMERCIAL

## 2024-07-16 VITALS
DIASTOLIC BLOOD PRESSURE: 89 MMHG | BODY MASS INDEX: 25.71 KG/M2 | SYSTOLIC BLOOD PRESSURE: 149 MMHG | HEIGHT: 66 IN | HEART RATE: 86 BPM | OXYGEN SATURATION: 96 % | TEMPERATURE: 98.1 F | WEIGHT: 160 LBS

## 2024-07-16 DIAGNOSIS — I10 BENIGN ESSENTIAL HTN: ICD-10-CM

## 2024-07-16 DIAGNOSIS — E66.3 OVERWEIGHT (BMI 25.0-29.9): ICD-10-CM

## 2024-07-16 DIAGNOSIS — G47.33 OSA (OBSTRUCTIVE SLEEP APNEA): Primary | ICD-10-CM

## 2024-07-16 DIAGNOSIS — G47.33 OBSTRUCTIVE SLEEP APNEA: ICD-10-CM

## 2024-07-16 PROCEDURE — 1036F TOBACCO NON-USER: CPT | Performed by: NURSE PRACTITIONER

## 2024-07-16 PROCEDURE — 3077F SYST BP >= 140 MM HG: CPT | Performed by: NURSE PRACTITIONER

## 2024-07-16 PROCEDURE — 99204 OFFICE O/P NEW MOD 45 MIN: CPT | Performed by: NURSE PRACTITIONER

## 2024-07-16 PROCEDURE — G2211 COMPLEX E/M VISIT ADD ON: HCPCS | Performed by: NURSE PRACTITIONER

## 2024-07-16 PROCEDURE — 3079F DIAST BP 80-89 MM HG: CPT | Performed by: NURSE PRACTITIONER

## 2024-07-16 ASSESSMENT — SLEEP AND FATIGUE QUESTIONNAIRES
DIFFICULTY_STAYING_ASLEEP: MILD
SITING INACTIVE IN A PUBLIC PLACE LIKE A CLASS ROOM OR A MOVIE THEATER: WOULD NEVER DOZE
HOW LIKELY ARE YOU TO NOD OFF OR FALL ASLEEP WHEN YOU ARE A PASSENGER IN A CAR FOR AN HOUR WITHOUT A BREAK: WOULD NEVER DOZE
ESS-CHAD TOTAL SCORE: 2
HOW LIKELY ARE YOU TO NOD OFF OR FALL ASLEEP IN A CAR, WHILE STOPPED FOR A FEW MINUTES IN TRAFFIC: WOULD NEVER DOZE
HOW LIKELY ARE YOU TO NOD OFF OR FALL ASLEEP WHILE SITTING AND TALKING TO SOMEONE: WOULD NEVER DOZE
HOW LIKELY ARE YOU TO NOD OFF OR FALL ASLEEP WHILE WATCHING TV: SLIGHT CHANCE OF DOZING
HOW LIKELY ARE YOU TO NOD OFF OR FALL ASLEEP WHILE SITTING QUIETLY AFTER LUNCH WITHOUT ALCOHOL: WOULD NEVER DOZE
SATISFACTION_WITH_CURRENT_SLEEP_PATTERN: SATISFIED
WORRIED_DISTRESSED_DUE_TO_SLEEP: A LITTLE
DIFFICULTY_FALLING_ASLEEP: MODERATE
SLEEP_PROBLEM_NOTICEABLE_TO_OTHERS: NOT AT ALL NOTICEABLE
SLEEP_PROBLEM_INTERFERES_DAILY_ACTIVITIES: A LITTLE
HOW LIKELY ARE YOU TO NOD OFF OR FALL ASLEEP WHILE LYING DOWN TO REST IN THE AFTERNOON WHEN CIRCUMSTANCES PERMIT: SLIGHT CHANCE OF DOZING
HOW LIKELY ARE YOU TO NOD OFF OR FALL ASLEEP WHILE SITTING AND READING: WOULD NEVER DOZE
WAKING_TOO_EARLY: MILD

## 2024-07-16 ASSESSMENT — ENCOUNTER SYMPTOMS
DEPRESSION: 0
OCCASIONAL FEELINGS OF UNSTEADINESS: 0
LOSS OF SENSATION IN FEET: 0

## 2024-07-16 ASSESSMENT — PAIN SCALES - GENERAL: PAINLEVEL: 0-NO PAIN

## 2024-07-16 NOTE — PATIENT INSTRUCTIONS
[facility]  DO [address] [place] [address] [address] [place] [address]       NAME: Edgar Covarrubias   DATE: 07/16/24    Your Sleep Provider Today: ALISSON Miller  Your Primary Care Physician: Gt Harris DO       DIAGNOSIS:   1. KAYLAH (obstructive sleep apnea)        2. Obstructive sleep apnea  Referral to Adult Sleep Medicine          Thank you for coming to the Sleep Medicine Clinic today! Your sleep medicine provider today was: ALISSON Miller Below is a summary of your treatment plan, other important information, and our contact numbers:      TREATMENT PLAN     - Follow-up in 3 months.  - If not already done, sign up for 'My Chart' and send prescription requests or messages through this  - CPAP ordered via [employer]     Obstructive sleep apnea (KAYLAH): KAYLAH is a sleep disorder where your upper airway muscles relax during sleep and the airway intermittently and repetitively narrows and collapses leading to blocked airway (apnea) which, in turn, can disrupt breathing in sleep, lower oxygen levels while you sleep and cause night time wakings. Because apnea may cause higher carbon dioxide or low oxygen levels, untreated KAYLAH can lead to heart arrhythmia, elevation of blood pressure, and make it harder for the body to consolidate memory and metabolize (leading to higher blood sugars at night).   Frequent partial arousals occur during sleep resulting in sleep deprivation and daytime sleepiness. KAYLAH is associated with an increased risk of cardiovascular disease, stroke, hypertension, and insulin resistance. Moreover, untreated KAYLAH with excessive daytime sleepiness can increase the risk of motor vehicular accidents.    Some conservative strategies for KAYLAH are:   Positional therapy - Avoid sleeping on your back.   Healthy diet, exercise, and optimizing weight encouraged.   Avoid alcohol late in the evening as it can  "make sleep apnea worse.     Safety: Avoid driving and operating heavy equipment while sleepy. Drowsy driving may lead to life-threatening motor vehicle accidents.     Common treatment options for sleep apnea include weight management, positional therapy, Positive Airway Therapy (PAP) therapy, oral appliance therapy, hypoglossal nerve stimulation, and select airway surgeries.    Starting Positive Airway Pressure: You were ordered a device to wear when you sleep called PAP (Positive Airway Pressure) to treat your sleep apnea. The order will be submitted to a durable medical equipment company who will arrange setting you up with the device. They will provide all the necessary equipment and discuss use and maintenance of the device with you.     **Please bring all PAP equipment with you to follow up appointments unless told otherwise.**           Important things to keep in mind as you start PAP    Insurance will monitor your usage during the first 90 days.  You should use your PAP - \"all night, every night\", for your health. The bare minimum is to use your PAP device while sleeping = at least 4 hours per day at least 5 days per week. Otherwise, your PAP device may be reclaimed by your PAP vendor at 90 days.  There are many mask to choose from to wear with your PAP machine. If you are not comfortable with the first mask issued to you, call your DME and ask for another option to try (within the first 30 days).  Discuss with your provider if you are having issues breathing with the machine or the temperature or humidity feel uncomfortable.  Expect to have an adjustment period when you start your device. It helps to continuing wearing the machine every day for a period of time until you get more used to it. You can practice with wearing the mask alone if you need, then add in the PAP air pressure a few days later.   Reach out for help if you are struggling! The sleep medicine department can be reached at [phone]  We " encourage you to download data monitoring apps to your phone. For [employer] 10/11 - MyAir [employer]ica. For [employer] - DreamMapper. Both are available in the [employer] store for free and are a great tool to monitor your progress with your CPAP night to night.    IF YOU ARE HAVING TROUBLE GETTING USED TO YOUR PAP DEVICE    The following steps are recommended to help you get accustomed to using CPAP and improve your CPAP usage at home:    Use CPAP every night for 5 to 10 minutes while awake in the evening without fail.  Be sure to remain awake while breathing on the nasal mask for the first 1 to 2 weeks.  After 2 to 4 weeks, start using CPAP at night when you go to sleep…But be sure to take the mask off if you have not fallen asleep in 5 to 10 minutes, or if you fall asleep.  Take the mask off whenever you become aware of it or the moment you wake up.   Continue this process every night, with confidence that you will gradually become accustomed to using CPAP over time.    Be sure you are using a comfortable mask, and that you are applying it snugly (but not too tight).    Common issues with PAP machine:  Mask gets dislodged when turning to the side: Consider getting a CPAP pillow or switching to a mask with hose on top.     Dry mouth:  Your machine has built-in humidifier that heats up the air to prevent dry mouth. It can be adjusted to your comfort. You can try that first and increase setting one level one night at a time to check which setting is comfortable and effective in lessening dry mouth. If dry mouth persists despite humidity setting adjustment, may apply OTC Biotene gel over the gums at bedtime.  If Biotene gel is not effective, consider trying XEROSTOM gel from [employer].  If using nasal pillows or nasal mask, consider adding chinstrap or mouth tape to keep your mouth closed while you sleep. Also, eliminate or reduce dose of meds that can cause dry mouth if possible. Lastly, may try getting a separate  "room humidifier machine.    Airleaks: Please call DME as they may need to adjust your mask or refit you with a different kind of mask. In addition, you can ask DME for tips on getting a good mask seal and mask fit.     Difficulty tolerating the mask: Contact your DME to try a different kind of mask and/or call office to get a referral to Sleep Psychologist for CPAP desensitization. CPAP desensitization technique is a set of strategies that helps patient cope with claustrophobia and anxiety related to wearing mask. Alternatively, we can do a daytime mini-sleep study called PAP-nap trial wherein you will try on different kinds of mask and the sleep technician will try different pressure settings on CPAP and BPAP machines to see which specific pressure is tolerable and comfortable for you.     Water droplets or moisture within the hose and/or mask: This is called rain-out and it is caused by condensation of too much heated humidity on the cooler walls of the hose. If you have rain-out, turn down/decrease your humidity setting or get a heated hose. If you already have a heated hose, turn up the \"tube temperature\" of the heated hose. Alternatively, if you don't want to get a heated hose or warmer air, may wrap the CPAP hose with stockings to keep it somewhat warm. Also, you need to place the machine on the floor and lower the hose so that water won't travel upward towards your mask.     Maintaining your CPAP/BPAP device:    The humidification chamber (aka water tank or water chamber) needs to be filled with distilled water to prevent buildup of white deposits in the future. If you cannot find distilled water, you can use tap water but expect to have white deposits buildup seen after prolonged use with tap water. If you start seeing white deposits on the water chamber, you can clean it by filling it with equal parts of distilled white vinegar and water. Let the vinegar-water mixture sit for 2 hours, and then rinse it with " running tap water. Clean with soap and water then let it dry.     You should try to keep your machine clean in order to work well. Here are some tips to clean PAP supplies / accessories:    Clean the humidification chamber (aka water tank) as well as your mask and tubing at least once a week with soap and water.   Alternatively, you can fill a sink or basin with warm water and add a little mild detergent, like Ivory dish soap. Gently wipe your supplies with the soapy water to free all the oils and dirt that may have collected. Once that's done, rinse these items with clean water until the soap is gone and let them air dry. You can hang your tubing over the curtain valerie in your bathroom so that it dries.  The mask insert (part of the mask that has contact with your skin) needs to be cleaned with soap and water daily. Another option is to wipe them down with CPAP wipes or baby wipes.    You should replace your mask and tubing frequently in order to prevent bacteria buildup, machine damage, and mask seal issues. The older the mask and hose, the high likelihood that there is bacteria buildup in it especially if they are not cleaned regularly. Dirty filters damage machines because build-up of dust and contaminants can cause machine to over-heat, and in time, damage the motor of machine. Cushions lose their seal over time as most masks are made of plastic and silicone while headgear is made of neoprene. These materials will break down with age and frequent use. Here is the recommended replacement schedule for PAP supplies / accessories:    Twice a month- disposable filters and cushions for nasal mask or nasal pillows.  Once a month- cushion for full face mask  Every 3 months- mask with headgear and PAP tubing (standard or heated hose)  Every 6 months- reusable filter, water chamber, and chin strap     Other useful information:    Some people do not put water in the tank while other people prefers to put water in the tank  to prevent mouth dryness. Try to experiment to determine which is more comfortable for you.   In general, new machines have 2 years warranty on parts while health insurance allows you to have a new machine once every 5 years.     You can also go to the following EDUCATION WEBSITES for further information:   American Academy of Sleep Medicine http://sleepeducation.org  National Sleep Foundation: https://sleepfoundation.org  American Sleep Apnea Association: https://www.sleepapnea.org (for patients with sleep apnea)    Here at [facility], we wish you a restful sleep!       IMPORTANT INFORMATION     Call 911 for medical emergencies.  Our offices are generally open from Monday-Friday, 9 am - 5 pm.  If you need to get in touch with me, you may either call me/my team (number is below) or you can use [employer].  If a referral for a test, for CPAP, or for another specialist was made, and you have not heard about scheduling this within a week, please call scheduling at [phone]).  If you are unable to make your appointment for clinic or an overnight study, kindly call the office at least 48 hours in advance to cancel and reschedule.  If you are on CPAP, please bring your device's card or the device to each clinic appointment.   There are no supporting services by either the sleep doctors or their staff on weekends and Holidays, or after 5 PM on weekdays.     PRESCRIPTIONS     We require 7 days advanced notice for prescription refills. If we do not receive the request in this time, we cannot guarantee that your medication will be refilled in time.      IMPORTANT PHONE NUMBERS     [facility]: [phone]  [employer] ([employer]): [phone]  [facility] [facility] ([employer]): [phone]  [facility] ([employer]): [phone]    CONTACTING YOUR SLEEP MEDICINE PROVIDER AND SLEEP TEAM      For issues with your machine or mask interface, please call your DME provider first. [employer] stands for  "durable medical company. DME is the company who provides you the machine and/or PAP supplies / accessories.   To schedule, cancel, or reschedule SLEEP STUDY APPOINTMENTS, please call the Main Phone Line at [phone] - option 3.   To schedule, cancel, or reschedule CLINIC APPOINTMENTS, you can do it in \"[employer]\", call [phone] for [facility] office , [phone] for [place] office to speak with my on site staff, or call the Main Phone Line at [phone] - option 2  For CLINICAL QUESTIONS or MEDICATION REFILLS, please call direct line for Adult Sleep Nurses at [phone].   Lastly, you can also send a message directly to your provider through \"My Chart\", which is the email service through your  Records Account: [email or web address]     Adult Sleep Nurses (Claudine Virk RN and Glenys Jeffers RN):  For clinical questions and refilling prescriptions: [phone]  Email sleep diaries and other documents at: [email or web address]    Office locations for Ana Luisa Tee NP:    [address]   [address]  [place] [address] [phone]    [address]  [address]  [place] [address] [phone]      OUR SLEEP TESTING LOCATIONS     Our team will contact you to schedule your sleep study, however, you can contact us as follow:  Main Phone Line (scheduling only): [phone]), option 3    Sleep Testing Locations:   [place] (18 years and older): [address] [place] [address], 2nd floor   [place] (18 years and older): [address] [facility]; 4th floor  After hours line: [phone]  [facility] (18 years and older) at [place]: [id number] [place] [address]  After hours line: [phone]   [facility] at [facility] (Main campus: All ages): [facility], 6th floor. After hours line: [phone]   [place] (5 years and older; younger considered on case-by-case basis): [address]; [facility] [address], [address]. Scheduling  After hours line: [phone]       Here at [facility], we wish you a restful sleep!    Your sleep medicine provider for this visit was: LAN Miller-CNP

## 2024-09-19 ENCOUNTER — OFFICE VISIT (OUTPATIENT)
Dept: URGENT CARE | Age: 54
End: 2024-09-19
Payer: COMMERCIAL

## 2024-09-19 VITALS
HEART RATE: 90 BPM | OXYGEN SATURATION: 97 % | SYSTOLIC BLOOD PRESSURE: 170 MMHG | BODY MASS INDEX: 26.66 KG/M2 | RESPIRATION RATE: 20 BRPM | HEIGHT: 65 IN | DIASTOLIC BLOOD PRESSURE: 96 MMHG | WEIGHT: 160 LBS | TEMPERATURE: 98.5 F

## 2024-09-19 DIAGNOSIS — J01.10 ACUTE NON-RECURRENT FRONTAL SINUSITIS: Primary | ICD-10-CM

## 2024-09-19 RX ORDER — AMOXICILLIN AND CLAVULANATE POTASSIUM 875; 125 MG/1; MG/1
1 TABLET, FILM COATED ORAL 2 TIMES DAILY
Qty: 20 TABLET | Refills: 0 | Status: SHIPPED | OUTPATIENT
Start: 2024-09-19 | End: 2024-09-29

## 2024-09-19 RX ORDER — FLUTICASONE PROPIONATE 50 MCG
2 SPRAY, SUSPENSION (ML) NASAL DAILY
Qty: 16 G | Refills: 0 | Status: SHIPPED | OUTPATIENT
Start: 2024-09-19 | End: 2025-09-19

## 2024-09-19 ASSESSMENT — ENCOUNTER SYMPTOMS
PSYCHIATRIC NEGATIVE: 1
SINUS PRESSURE: 1
GASTROINTESTINAL NEGATIVE: 1
CONSTITUTIONAL NEGATIVE: 1
MUSCULOSKELETAL NEGATIVE: 1
ENDOCRINE NEGATIVE: 1
NEUROLOGICAL NEGATIVE: 1
ALLERGIC/IMMUNOLOGIC NEGATIVE: 1
PALPITATIONS: 0
RESPIRATORY NEGATIVE: 1
SINUS PAIN: 1
EYES NEGATIVE: 1
HEMATOLOGIC/LYMPHATIC NEGATIVE: 1

## 2024-09-19 NOTE — PROGRESS NOTES
Subjective   Patient ID: Edgar Covarrubias is a 54 y.o. male. They present today with a chief complaint of Cough (Green sputum x 1wk, pt was on z pac antibiotic but did not finish ), Sinus Problem, and Nasal Congestion (Drainage).    History of Present Illness  HPI  Pt presents to urgent care with c/o  nasal congestion, sinus pressure, green nasal drainage x 1 .  States he usually gets sinus infections around this time of year and this feels similar. Pt denies CP, SOB, palpitations, fevers, abd pain, n/v/d, sick contacts, recent travel.        Gets sinus infection this time of year    Past Medical History  Allergies as of 09/19/2024 - Reviewed 09/19/2024   Allergen Reaction Noted    Bee pollen Runny nose 08/09/2023       (Not in a hospital admission)       Past Medical History:   Diagnosis Date    Abscess of sigmoid colon due to diverticulitis 07/28/2023    Acute gastritis without bleeding 03/04/2020    Acute gastritis without hemorrhage, unspecified gastritis type    Anesthesia of skin     Numbness and tingling    Bursitis of left shoulder 02/19/2021    Subacromial bursitis of left shoulder joint    Bursitis of unspecified shoulder 02/19/2021    Subacromial bursitis    Digestive system disorder 07/28/2023    Disease of digestive system, unspecified     Digestive problems    Diverticulitis of large intestine with perforation and abscess without bleeding 06/03/2020    Abscess of sigmoid colon due to diverticulitis    Epididymitis 05/13/2021    Epididymitis, left    Iron deficiency anemia 07/28/2023    Left lower quadrant pain 03/04/2020    Abdominal pain, LLQ (left lower quadrant)    Meralgia paresthetica, left lower limb 05/13/2021    Meralgia paresthetica of left side    Numbness and tingling sensation of skin 07/28/2023    Other acute postprocedural pain 08/18/2020    Post-operative pain    Other shoulder lesions, unspecified shoulder 02/19/2021    Rotator cuff tendonitis    Other symptoms and signs involving the  musculoskeletal system 01/10/2021    Shoulder weakness    Pain in right shoulder 10/06/2020    Shoulder pain, right    Pain in unspecified shoulder 01/10/2021    Shoulder pain    Personal history of diseases of the blood and blood-forming organs and certain disorders involving the immune mechanism 08/12/2020    History of iron deficiency anemia    Personal history of nicotine dependence 09/14/2020    History of tobacco use disorder    Personal history of other diseases of the digestive system 08/12/2020    History of constipation    Personal history of other diseases of the digestive system 12/23/2020    History of small bowel obstruction    Personal history of other diseases of the digestive system 08/06/2020    History of diverticulitis of colon    Personal history of other endocrine, nutritional and metabolic disease 09/14/2020    History of vitamin D deficiency    Right upper quadrant pain 05/06/2020    Colicky right upper quadrant pain    Small bowel obstruction (Multi) 07/28/2023    Subacromial bursitis of left shoulder joint 07/28/2023       Past Surgical History:   Procedure Laterality Date    OTHER SURGICAL HISTORY  07/15/2020    Colostomy closure    OTHER SURGICAL HISTORY  05/06/2020    Abscess incision and drainage    OTHER SURGICAL HISTORY  11/25/2020    Abdominal surgery    OTHER SURGICAL HISTORY  03/25/2020    Colostomy    OTHER SURGICAL HISTORY  03/25/2020    Colon surgery    US ASPIRATION INJECTION INTERMEDIATE JOINT  10/5/2020    US ASPIRATION INJECTION INTERMEDIATE JOINT 10/5/2020 [facility]    US ASPIRATION INJECTION INTERMEDIATE JOINT  1/8/2021    US ASPIRATION INJECTION INTERMEDIATE JOINT 1/8/2021 [facility]        reports that he has quit smoking. His smoking use included cigarettes. He has never used smokeless tobacco. He reports current alcohol use. He reports that he does not use drugs.    Review of Systems  Review of Systems   Constitutional: Negative.    HENT:  Positive  "for congestion, sinus pressure and sinus pain.    Eyes: Negative.    Respiratory: Negative.     Cardiovascular:  Negative for chest pain and palpitations.   Gastrointestinal: Negative.    Endocrine: Negative.    Genitourinary: Negative.    Musculoskeletal: Negative.    Skin: Negative.    Allergic/Immunologic: Negative.    Neurological: Negative.    Hematological: Negative.    Psychiatric/Behavioral: Negative.     All other systems reviewed and are negative.                                 Objective    Vitals:    09/19/24 0947   BP: (!) 170/96   Pulse: 90   Resp: 20   Temp: 36.9 °C (98.5 °F)   SpO2: 97%   Weight: 72.6 kg (160 lb)   Height: 1.651 m (5' 5\")     No LMP for male patient.    Physical Exam  Vitals and nursing note reviewed.   Constitutional:       General: He is not in acute distress.     Appearance: Normal appearance. He is not ill-appearing or toxic-appearing.   HENT:      Head: Atraumatic.      Right Ear: Tympanic membrane, ear canal and external ear normal.      Left Ear: Tympanic membrane, ear canal and external ear normal.      Nose: Congestion present.      Mouth/Throat:      Mouth: Mucous membranes are moist.      Pharynx: Oropharynx is clear. No oropharyngeal exudate or posterior oropharyngeal erythema.   Eyes:      Extraocular Movements: Extraocular movements intact.      Conjunctiva/sclera: Conjunctivae normal.      Pupils: Pupils are equal, round, and reactive to light.   Cardiovascular:      Rate and Rhythm: Normal rate and regular rhythm.      Pulses: Normal pulses.      Heart sounds: Normal heart sounds.   Pulmonary:      Effort: Pulmonary effort is normal.      Breath sounds: Normal breath sounds.   Abdominal:      General: Abdomen is flat. Bowel sounds are normal.      Palpations: Abdomen is soft.      Tenderness: There is no abdominal tenderness.   Musculoskeletal:         General: Normal range of motion.      Cervical back: Normal range of motion and neck supple. No tenderness.   Skin:   "   General: Skin is warm and dry.      Capillary Refill: Capillary refill takes less than 2 seconds.   Neurological:      General: No focal deficit present.      Mental Status: He is alert and oriented to person, place, and time.   Psychiatric:         Mood and Affect: Mood normal.         Behavior: Behavior normal.         Thought Content: Thought content normal.         Procedures    Point of Care Test & Imaging Results from this visit  No results found for this visit on 09/19/24.   No results found.    Diagnostic study results (if any) were reviewed by ALISSON Bynum.    Assessment/Plan   Allergies, medications, history, and pertinent labs/EKGs/Imaging reviewed by ALISSON Bynum.     Medical Decision Making  Urgent Care Course: Pt presents to the  with rhinorrhea, cough, sinus congestion.  Patient is afebrile, hemodynamically stable.  Patient denies fever, n/v, cp, sob, ab pain, dysuria, and diarrhea.  Patient states that they normally get sinusitis this time of year and requires antibiotics.  Low suspicion for pneumonia/bronchitis given that patient's lungs are clear to auscultation bilaterally.  Given longevity of symptoms, will treat patient for sinusitis with augmentin, flonase.  Patient given sinusitis and pneumonia warning signs, prescription for augmentin, flonase and will f/u with pcp.    Prior external records reviewed: Outpatient records  Independent Hx provided by: Patient  Med Rx considered but ultimately not given: Steroids   Dx tests considered but ultimately not ordered: Covid, Influenza  Social determinant that may affects healthcare: Pharmacy closed  Pt's case/impression summarized and discussed with: Patient  Likely Dx given clinical picture: Sinusitis   Although not an exhaustive list of Differential Diagnosis (though considered), patient's HPI, PE, and other findings are not suggestive of: Sinusitis, URI, bronchitis, pneumonia, Covid-19, influenza, asthma exacerbation    Patient at time of discharge was clinically well-appearing and HDS for outpatient management. The patient and/or family was given the opportunity to ask questions prior to discharge, understood my verbal discussion of the plans for treatment, expected course, indications to return to ED, and the need for timely follow up as directed.    Condition: Stable     Orders and Diagnoses  Diagnoses and all orders for this visit:  Acute non-recurrent frontal sinusitis  -     amoxicillin-pot clavulanate (Augmentin) 875-125 mg tablet; Take 1 tablet by mouth 2 times a day for 10 days.  -     fluticasone (Flonase) 50 mcg/actuation nasal spray; Administer 2 sprays into each nostril once daily. Shake gently. Before first use, prime pump. After use, clean tip and replace cap.      Medical Admin Record      Patient disposition: Home    Electronically signed by ALISSON Bynum  10:23 AM

## 2024-10-02 ENCOUNTER — TELEPHONE (OUTPATIENT)
Dept: PULMONOLOGY | Facility: HOSPITAL | Age: 54
End: 2024-10-02
Payer: COMMERCIAL

## 2024-10-09 ENCOUNTER — APPOINTMENT (OUTPATIENT)
Dept: SURGERY | Facility: CLINIC | Age: 54
End: 2024-10-09
Payer: COMMERCIAL

## 2024-10-09 VITALS
BODY MASS INDEX: 26.63 KG/M2 | DIASTOLIC BLOOD PRESSURE: 87 MMHG | HEART RATE: 86 BPM | SYSTOLIC BLOOD PRESSURE: 128 MMHG | WEIGHT: 160 LBS

## 2024-10-09 DIAGNOSIS — R10.31 CHRONIC GROIN PAIN, RIGHT: Primary | ICD-10-CM

## 2024-10-09 DIAGNOSIS — G89.29 CHRONIC GROIN PAIN, RIGHT: Primary | ICD-10-CM

## 2024-10-09 PROCEDURE — 3079F DIAST BP 80-89 MM HG: CPT | Performed by: SURGERY

## 2024-10-09 PROCEDURE — 99213 OFFICE O/P EST LOW 20 MIN: CPT | Performed by: SURGERY

## 2024-10-09 PROCEDURE — 3074F SYST BP LT 130 MM HG: CPT | Performed by: SURGERY

## 2024-10-16 ENCOUNTER — LAB (OUTPATIENT)
Dept: LAB | Facility: LAB | Age: 54
End: 2024-10-16
Payer: COMMERCIAL

## 2024-10-16 DIAGNOSIS — R10.31 CHRONIC GROIN PAIN, RIGHT: ICD-10-CM

## 2024-10-16 DIAGNOSIS — G89.29 CHRONIC GROIN PAIN, RIGHT: ICD-10-CM

## 2024-10-16 LAB
CREAT SERPL-MCNC: 0.91 MG/DL (ref 0.5–1.3)
EGFRCR SERPLBLD CKD-EPI 2021: >90 ML/MIN/1.73M*2

## 2024-10-16 PROCEDURE — 36415 COLL VENOUS BLD VENIPUNCTURE: CPT

## 2024-10-16 PROCEDURE — 82565 ASSAY OF CREATININE: CPT

## 2024-10-18 ENCOUNTER — HOSPITAL ENCOUNTER (OUTPATIENT)
Dept: RADIOLOGY | Facility: HOSPITAL | Age: 54
Discharge: HOME | End: 2024-10-18
Payer: COMMERCIAL

## 2024-10-18 DIAGNOSIS — G89.29 CHRONIC GROIN PAIN, RIGHT: ICD-10-CM

## 2024-10-18 DIAGNOSIS — R10.31 CHRONIC GROIN PAIN, RIGHT: ICD-10-CM

## 2024-10-18 PROCEDURE — 2550000001 HC RX 255 CONTRASTS: Performed by: SURGERY

## 2024-10-18 PROCEDURE — A9698 NON-RAD CONTRAST MATERIALNOC: HCPCS | Performed by: SURGERY

## 2024-10-18 PROCEDURE — 74177 CT ABD & PELVIS W/CONTRAST: CPT

## 2024-10-24 ENCOUNTER — APPOINTMENT (OUTPATIENT)
Dept: SURGERY | Facility: CLINIC | Age: 54
End: 2024-10-24
Payer: COMMERCIAL

## 2024-10-24 VITALS — WEIGHT: 160 LBS | BODY MASS INDEX: 26.66 KG/M2 | HEIGHT: 65 IN

## 2024-10-24 DIAGNOSIS — G89.29 CHRONIC GROIN PAIN, RIGHT: Primary | ICD-10-CM

## 2024-10-24 DIAGNOSIS — R10.31 CHRONIC GROIN PAIN, RIGHT: Primary | ICD-10-CM

## 2024-10-24 PROCEDURE — 1036F TOBACCO NON-USER: CPT | Performed by: SURGERY

## 2024-10-24 PROCEDURE — 99213 OFFICE O/P EST LOW 20 MIN: CPT | Performed by: SURGERY

## 2024-10-24 PROCEDURE — 3008F BODY MASS INDEX DOCD: CPT | Performed by: SURGERY

## 2024-10-24 RX ORDER — ACETAMINOPHEN 325 MG/1
975 TABLET ORAL ONCE
OUTPATIENT
Start: 2024-10-24 | End: 2024-10-24

## 2024-10-24 RX ORDER — CEFAZOLIN SODIUM 2 G/100ML
2 INJECTION, SOLUTION INTRAVENOUS ONCE
OUTPATIENT
Start: 2024-10-24 | End: 2024-10-24

## 2024-10-24 RX ORDER — HEPARIN SODIUM 5000 [USP'U]/ML
5000 INJECTION, SOLUTION INTRAVENOUS; SUBCUTANEOUS ONCE
OUTPATIENT
Start: 2024-10-24 | End: 2024-10-24

## 2024-10-24 ASSESSMENT — ENCOUNTER SYMPTOMS: ROS GI COMMENTS: RIGHT GROIN PAIN

## 2024-10-24 NOTE — PROGRESS NOTES
Subjective   Patient ID: Edgar Covarrubias is a 54 y.o. male who presents for Follow-up (Ct scan results).  HPI  Patient returns after CAT scan.  Continues to have discomfort especially in the right groin after he sneezes also has a left hip mass  Review of Systems   Gastrointestinal:         Right groin pain   All other systems reviewed and are negative.      Objective   Physical Exam  Abdominal:      Comments: Abdomen is soft nontender moderate size right inguinal hernia reducible left side small nontender 4 x 4 centimeter lipomatous mass left hip     Physical Exam  Constitutional:       Appearance: Normal appearance.   HENT:      Head: Normocephalic and atraumatic.      Mouth/Throat:      Pharynx: Oropharynx is clear.   Eyes:      Pupils: Pupils are equal, round, and reactive to light.   Cardiovascular:      Rate and Rhythm: Normal rate and regular rhythm.   Pulmonary:      Effort: Pulmonary effort is normal.      Breath sounds: Normal breath sounds.   Abdominal:      General: Abdomen is flat. Bowel sounds are normal.      Palpations: Abdomen is soft.   Musculoskeletal:         General: Normal range of motion.      Cervical back: Normal range of motion.   Skin:     General: Skin is warm.   Neurological:      General: No focal deficit present.      Mental Status: She is alert. Mental status is at baseline.   Psychiatric:         Mood and Affect: Mood normal.       Assessment/Plan   CT abdomen pelvis w IV contrast    Result Date: 10/19/2024  Interpreted By:  Viki Flores and Meyers Emily STUDY:   CT ABDOMEN PELVIS W IV CONTRAST;  10/18/2024 9:57 am   INDICATION: Signs/Symptoms:right groin pain.   ,R10.31 Right lower quadrant pain,G89.29 Other chronic pain   COMPARISON: CT abdomen pelvis 09/07/2023, CT abdomen pelvis 11/12/2020, CT abdomen pelvis 05/15/2020   ACCESSION NUMBER(S): [id number]   ORDERING CLINICIAN: ADRYAN DELUCA   TECHNIQUE: CT of the abdomen and pelvis was performed.  Standard contiguous axial  images were obtained at 3 mm slice thickness through the abdomen and pelvis. Coronal and sagittal reconstructions at 3 mm slice thickness were performed.   75 mL Omnipaque 350 contrast administered intravenously without immediate complication. Oral contrast was also administered.   FINDINGS: Abdomen: Bilateral fat containing inguinal hernias.   Focal fatty infiltrate noted about the gallbladder fossa and to a lesser extent falciform ligament.   The liver, gallbladder, spleen, pancreas, and adrenal glands are otherwise within normal limits.   Nonobstructing right renal calculi measuring up to 0.3 cm. Otherwise, the kidneys are unremarkable in appearance. No hydroureteronephrosis.   Patulous appearance of the partially visualized distal esophagus, findings which may be seen in the setting of a sequelae of gastroesophageal reflux disease. The bowel is normal caliber, without evidence of obstruction, focal bowel wall thickening, or inflammatory process. Oral contrast is noted throughout several loops of small bowel. Unremarkable appearing anastomotic suture line noted about the sigmoid colon within the pelvis. Oral contrast is noted throughout several loops of small bowel without evidence of obstruction. Moderate colonic stool burden.   No mesenteric or retroperitoneal lymphadenopathy. Waxing and waning appearance of few prominent right lower quadrant lymph nodes measuring up to 0.9 cm, minimally increasing and decreasing size over several serial prior examinations. No ascites. Moderate atherosclerotic calcifications of the distal abdominal aorta and its branches. Otherwise, vascular structures are unremarkable.   Pelvis: Urinary bladder is unremarkable. The prostate is normal in size. No pelvic lymphadenopathy.   Bones: No acute osseous abnormality. Mild anterior wedging deformity noted about the T9 and T10 vertebral bodies, similar in appearance when compared to prior exam. Trace grade 1 retrolisthesis of L5 on S1.  Mild multilevel degenerative change of the lumbar spine.   Lung bases: Mild dependent bibasilar atelectasis. Otherwise, no focal consolidation, pleural effusion, or pneumothorax. No suspicious solid pulmonary nodule.       1.  Bilateral fat containing inguinal hernias. 2. Waxing and waning appearance of few prominent right lower quadrant lymph nodes which measure up to 0.9 cm on current examination, though demonstrate increasing and decreasing size over several serial prior examinations. Findings are nonspecific, though may be seen in the setting of mesenteric adenitis. 3. Nonobstructing right renal calculi. 4. Patulous appearance of the partially visualized distal esophagus, findings which likely represent a sequelae of GERD. 5. Additional chronic findings stable as detailed above.   I personally reviewed the images/study, and I agree with the findings as stated above by resident physician Dr. Claudine Mccormick MD. This study was interpreted at [facility], [place], [place].   MACRO: None.   Signed by: Viki Flores 10/19/2024 9:44 PM Dictation workstation:   [id number]     CT reviewed in detail with the patient and wife has significant symptoms regarding hernia recommend open repair given his previous surgeries and they consent to proceed the possibility of bleeding infection chronic groin pain reviewed with him and wife and we will also excise hip mass at the same time       Luis Ramon MD 10/24/24 4:35 PM

## 2024-10-30 ENCOUNTER — APPOINTMENT (OUTPATIENT)
Dept: SLEEP MEDICINE | Facility: CLINIC | Age: 54
End: 2024-10-30
Payer: COMMERCIAL

## 2024-10-30 VITALS
DIASTOLIC BLOOD PRESSURE: 88 MMHG | HEIGHT: 66 IN | WEIGHT: 160 LBS | HEART RATE: 87 BPM | TEMPERATURE: 97.3 F | BODY MASS INDEX: 25.71 KG/M2 | SYSTOLIC BLOOD PRESSURE: 149 MMHG | OXYGEN SATURATION: 98 %

## 2024-10-30 DIAGNOSIS — G47.33 OSA (OBSTRUCTIVE SLEEP APNEA): Primary | ICD-10-CM

## 2024-10-30 DIAGNOSIS — E66.3 OVERWEIGHT (BMI 25.0-29.9): ICD-10-CM

## 2024-10-30 DIAGNOSIS — R09.81 NASAL CONGESTION: ICD-10-CM

## 2024-10-30 DIAGNOSIS — I10 BENIGN ESSENTIAL HTN: ICD-10-CM

## 2024-10-30 PROCEDURE — 99214 OFFICE O/P EST MOD 30 MIN: CPT | Performed by: NURSE PRACTITIONER

## 2024-10-30 PROCEDURE — 3077F SYST BP >= 140 MM HG: CPT | Performed by: NURSE PRACTITIONER

## 2024-10-30 PROCEDURE — 3079F DIAST BP 80-89 MM HG: CPT | Performed by: NURSE PRACTITIONER

## 2024-10-30 PROCEDURE — 3008F BODY MASS INDEX DOCD: CPT | Performed by: NURSE PRACTITIONER

## 2024-10-30 RX ORDER — FLUTICASONE FUROATE 27.5 UG/1
2 SPRAY, METERED NASAL NIGHTLY
Qty: 10 G | Refills: 11 | Status: SHIPPED | OUTPATIENT
Start: 2024-10-30 | End: 2025-10-30

## 2024-10-30 ASSESSMENT — SLEEP AND FATIGUE QUESTIONNAIRES
HOW LIKELY ARE YOU TO NOD OFF OR FALL ASLEEP IN A CAR, WHILE STOPPED FOR A FEW MINUTES IN TRAFFIC: WOULD NEVER DOZE
HOW LIKELY ARE YOU TO NOD OFF OR FALL ASLEEP WHILE SITTING AND TALKING TO SOMEONE: WOULD NEVER DOZE
ESS-CHAD TOTAL SCORE: 4
HOW LIKELY ARE YOU TO NOD OFF OR FALL ASLEEP WHILE SITTING QUIETLY AFTER LUNCH WITHOUT ALCOHOL: WOULD NEVER DOZE
HOW LIKELY ARE YOU TO NOD OFF OR FALL ASLEEP WHEN YOU ARE A PASSENGER IN A CAR FOR AN HOUR WITHOUT A BREAK: WOULD NEVER DOZE
HOW LIKELY ARE YOU TO NOD OFF OR FALL ASLEEP WHILE SITTING AND READING: SLIGHT CHANCE OF DOZING
HOW LIKELY ARE YOU TO NOD OFF OR FALL ASLEEP WHILE LYING DOWN TO REST IN THE AFTERNOON WHEN CIRCUMSTANCES PERMIT: SLIGHT CHANCE OF DOZING
HOW LIKELY ARE YOU TO NOD OFF OR FALL ASLEEP WHILE WATCHING TV: MODERATE CHANCE OF DOZING
SITING INACTIVE IN A PUBLIC PLACE LIKE A CLASS ROOM OR A MOVIE THEATER: WOULD NEVER DOZE

## 2024-10-31 ENCOUNTER — TELEPHONE (OUTPATIENT)
Dept: SLEEP MEDICINE | Facility: HOSPITAL | Age: 54
End: 2024-10-31

## 2024-10-31 NOTE — TELEPHONE ENCOUNTER
Submitted prior authorization for Flonase Sensimist 27.5MCG/SPRAY suspension via CoverMyMeds Key: [id number]

## 2024-11-07 ENCOUNTER — PATIENT MESSAGE (OUTPATIENT)
Dept: SLEEP MEDICINE | Facility: HOSPITAL | Age: 54
End: 2024-11-07
Payer: COMMERCIAL

## 2024-11-13 ENCOUNTER — OFFICE VISIT (OUTPATIENT)
Dept: SURGERY | Facility: CLINIC | Age: 54
End: 2024-11-13
Payer: COMMERCIAL

## 2024-11-13 VITALS
OXYGEN SATURATION: 98 % | SYSTOLIC BLOOD PRESSURE: 142 MMHG | HEART RATE: 88 BPM | WEIGHT: 160 LBS | DIASTOLIC BLOOD PRESSURE: 78 MMHG | BODY MASS INDEX: 25.82 KG/M2

## 2024-11-13 DIAGNOSIS — R10.31 CHRONIC GROIN PAIN, RIGHT: Primary | ICD-10-CM

## 2024-11-13 DIAGNOSIS — G89.29 CHRONIC GROIN PAIN, RIGHT: Primary | ICD-10-CM

## 2024-11-13 PROCEDURE — 3078F DIAST BP <80 MM HG: CPT | Performed by: SURGERY

## 2024-11-13 PROCEDURE — 3077F SYST BP >= 140 MM HG: CPT | Performed by: SURGERY

## 2024-11-13 PROCEDURE — 99212 OFFICE O/P EST SF 10 MIN: CPT | Performed by: SURGERY

## 2024-11-13 ASSESSMENT — PAIN SCALES - GENERAL: PAINLEVEL_OUTOF10: 2

## 2024-11-13 NOTE — PROGRESS NOTES
Subjective   Patient ID: Edgar Covarrubias is a 54 y.o. male who presents for Advice Only.  HPI    Review of Systems    Objective   Physical Exam    Assessment/Plan            Luis Ramon MD 11/13/24 4:51 PM

## 2024-11-13 NOTE — H&P (VIEW-ONLY)
Patient wanted to discuss upcoming surgery regarding bilateral hernia repair    Discussed in detail with the patient as the left side is asymptomatic and the right side will be approached in an open fashion bilateral open incisions will take longer to heal from recommend proceeding with current plan of open repair right side and observation on the left patient agreeable

## 2024-11-20 NOTE — PREPROCEDURE INSTRUCTIONS

## 2024-12-02 ENCOUNTER — ANESTHESIA EVENT (OUTPATIENT)
Dept: OPERATING ROOM | Facility: HOSPITAL | Age: 54
End: 2024-12-02
Payer: COMMERCIAL

## 2024-12-02 ENCOUNTER — ANESTHESIA (OUTPATIENT)
Dept: OPERATING ROOM | Facility: HOSPITAL | Age: 54
End: 2024-12-02
Payer: COMMERCIAL

## 2024-12-02 ENCOUNTER — HOSPITAL ENCOUNTER (OUTPATIENT)
Facility: HOSPITAL | Age: 54
Setting detail: OUTPATIENT SURGERY
Discharge: HOME | End: 2024-12-02
Attending: SURGERY | Admitting: SURGERY
Payer: COMMERCIAL

## 2024-12-02 VITALS
RESPIRATION RATE: 16 BRPM | HEART RATE: 75 BPM | DIASTOLIC BLOOD PRESSURE: 87 MMHG | WEIGHT: 156.53 LBS | SYSTOLIC BLOOD PRESSURE: 135 MMHG | OXYGEN SATURATION: 96 % | TEMPERATURE: 97.5 F | HEIGHT: 66 IN | BODY MASS INDEX: 25.16 KG/M2

## 2024-12-02 DIAGNOSIS — R10.31 CHRONIC GROIN PAIN, RIGHT: Primary | ICD-10-CM

## 2024-12-02 DIAGNOSIS — G89.29 CHRONIC GROIN PAIN, RIGHT: Primary | ICD-10-CM

## 2024-12-02 PROCEDURE — 2500000005 HC RX 250 GENERAL PHARMACY W/O HCPCS: Performed by: SURGERY

## 2024-12-02 PROCEDURE — 2500000004 HC RX 250 GENERAL PHARMACY W/ HCPCS (ALT 636 FOR OP/ED): Performed by: STUDENT IN AN ORGANIZED HEALTH CARE EDUCATION/TRAINING PROGRAM

## 2024-12-02 PROCEDURE — 3700000001 HC GENERAL ANESTHESIA TIME - INITIAL BASE CHARGE: Performed by: SURGERY

## 2024-12-02 PROCEDURE — 7100000010 HC PHASE TWO TIME - EACH INCREMENTAL 1 MINUTE: Performed by: SURGERY

## 2024-12-02 PROCEDURE — 7100000009 HC PHASE TWO TIME - INITIAL BASE CHARGE: Performed by: SURGERY

## 2024-12-02 PROCEDURE — 11400 EXC TR-EXT B9+MARG 0.5 CM<: CPT | Performed by: SURGERY

## 2024-12-02 PROCEDURE — 3600000008 HC OR TIME - EACH INCREMENTAL 1 MINUTE - PROCEDURE LEVEL THREE: Performed by: SURGERY

## 2024-12-02 PROCEDURE — 7100000001 HC RECOVERY ROOM TIME - INITIAL BASE CHARGE: Performed by: SURGERY

## 2024-12-02 PROCEDURE — 2500000001 HC RX 250 WO HCPCS SELF ADMINISTERED DRUGS (ALT 637 FOR MEDICARE OP): Performed by: SURGERY

## 2024-12-02 PROCEDURE — 3700000002 HC GENERAL ANESTHESIA TIME - EACH INCREMENTAL 1 MINUTE: Performed by: SURGERY

## 2024-12-02 PROCEDURE — 49505 PRP I/HERN INIT REDUC >5 YR: CPT | Performed by: SURGERY

## 2024-12-02 PROCEDURE — 2500000004 HC RX 250 GENERAL PHARMACY W/ HCPCS (ALT 636 FOR OP/ED): Mod: JZ | Performed by: SURGERY

## 2024-12-02 PROCEDURE — 96372 THER/PROPH/DIAG INJ SC/IM: CPT | Performed by: SURGERY

## 2024-12-02 PROCEDURE — 2720000007 HC OR 272 NO HCPCS: Performed by: SURGERY

## 2024-12-02 PROCEDURE — 2780000003 HC OR 278 NO HCPCS: Performed by: SURGERY

## 2024-12-02 PROCEDURE — 7100000002 HC RECOVERY ROOM TIME - EACH INCREMENTAL 1 MINUTE: Performed by: SURGERY

## 2024-12-02 PROCEDURE — C1781 MESH (IMPLANTABLE): HCPCS | Performed by: SURGERY

## 2024-12-02 PROCEDURE — 3600000003 HC OR TIME - INITIAL BASE CHARGE - PROCEDURE LEVEL THREE: Performed by: SURGERY

## 2024-12-02 PROCEDURE — 2500000004 HC RX 250 GENERAL PHARMACY W/ HCPCS (ALT 636 FOR OP/ED): Performed by: SURGERY

## 2024-12-02 PROCEDURE — 88304 TISSUE EXAM BY PATHOLOGIST: CPT | Mod: TC,ELYLAB,WESLAB | Performed by: SURGERY

## 2024-12-02 DEVICE — PERFIX PLUG, LARGE (CONTENTS: 6)
Type: IMPLANTABLE DEVICE | Site: INGUINAL | Status: FUNCTIONAL
Brand: PERFIX

## 2024-12-02 RX ORDER — ACETAMINOPHEN 325 MG/1
975 TABLET ORAL ONCE
Status: COMPLETED | OUTPATIENT
Start: 2024-12-02 | End: 2024-12-02

## 2024-12-02 RX ORDER — LABETALOL HYDROCHLORIDE 5 MG/ML
5 INJECTION, SOLUTION INTRAVENOUS ONCE AS NEEDED
Status: DISCONTINUED | OUTPATIENT
Start: 2024-12-02 | End: 2024-12-02 | Stop reason: HOSPADM

## 2024-12-02 RX ORDER — MEPERIDINE HYDROCHLORIDE 25 MG/ML
12.5 INJECTION INTRAMUSCULAR; INTRAVENOUS; SUBCUTANEOUS EVERY 10 MIN PRN
Status: DISCONTINUED | OUTPATIENT
Start: 2024-12-02 | End: 2024-12-02 | Stop reason: HOSPADM

## 2024-12-02 RX ORDER — DIPHENHYDRAMINE HYDROCHLORIDE 50 MG/ML
12.5 INJECTION INTRAMUSCULAR; INTRAVENOUS ONCE AS NEEDED
Status: DISCONTINUED | OUTPATIENT
Start: 2024-12-02 | End: 2024-12-02 | Stop reason: HOSPADM

## 2024-12-02 RX ORDER — CEFAZOLIN SODIUM 2 G/100ML
2 INJECTION, SOLUTION INTRAVENOUS ONCE
Status: COMPLETED | OUTPATIENT
Start: 2024-12-02 | End: 2024-12-02

## 2024-12-02 RX ORDER — FENTANYL CITRATE 50 UG/ML
50 INJECTION, SOLUTION INTRAMUSCULAR; INTRAVENOUS EVERY 5 MIN PRN
Status: DISCONTINUED | OUTPATIENT
Start: 2024-12-02 | End: 2024-12-02 | Stop reason: HOSPADM

## 2024-12-02 RX ORDER — SODIUM CHLORIDE, SODIUM LACTATE, POTASSIUM CHLORIDE, CALCIUM CHLORIDE 600; 310; 30; 20 MG/100ML; MG/100ML; MG/100ML; MG/100ML
100 INJECTION, SOLUTION INTRAVENOUS CONTINUOUS
Status: DISCONTINUED | OUTPATIENT
Start: 2024-12-02 | End: 2024-12-02 | Stop reason: HOSPADM

## 2024-12-02 RX ORDER — MIDAZOLAM HYDROCHLORIDE 1 MG/ML
INJECTION, SOLUTION INTRAMUSCULAR; INTRAVENOUS AS NEEDED
Status: DISCONTINUED | OUTPATIENT
Start: 2024-12-02 | End: 2024-12-02

## 2024-12-02 RX ORDER — HEPARIN SODIUM 5000 [USP'U]/ML
5000 INJECTION, SOLUTION INTRAVENOUS; SUBCUTANEOUS ONCE
Status: COMPLETED | OUTPATIENT
Start: 2024-12-02 | End: 2024-12-02

## 2024-12-02 RX ORDER — PHENYLEPHRINE HCL IN 0.9% NACL 1 MG/10 ML
SYRINGE (ML) INTRAVENOUS AS NEEDED
Status: DISCONTINUED | OUTPATIENT
Start: 2024-12-02 | End: 2024-12-02

## 2024-12-02 RX ORDER — KETOROLAC TROMETHAMINE 30 MG/ML
INJECTION, SOLUTION INTRAMUSCULAR; INTRAVENOUS AS NEEDED
Status: DISCONTINUED | OUTPATIENT
Start: 2024-12-02 | End: 2024-12-02

## 2024-12-02 RX ORDER — LIDOCAINE HYDROCHLORIDE 10 MG/ML
0.1 INJECTION, SOLUTION EPIDURAL; INFILTRATION; INTRACAUDAL; PERINEURAL ONCE
Status: DISCONTINUED | OUTPATIENT
Start: 2024-12-02 | End: 2024-12-02 | Stop reason: HOSPADM

## 2024-12-02 RX ORDER — OXYCODONE HYDROCHLORIDE 5 MG/1
5 TABLET ORAL EVERY 4 HOURS PRN
Status: CANCELLED | OUTPATIENT
Start: 2024-12-02

## 2024-12-02 RX ORDER — BUPIVACAINE HCL/EPINEPHRINE 0.25-.0005
VIAL (ML) INJECTION AS NEEDED
Status: DISCONTINUED | OUTPATIENT
Start: 2024-12-02 | End: 2024-12-02 | Stop reason: HOSPADM

## 2024-12-02 RX ORDER — FENTANYL CITRATE 50 UG/ML
INJECTION, SOLUTION INTRAMUSCULAR; INTRAVENOUS AS NEEDED
Status: DISCONTINUED | OUTPATIENT
Start: 2024-12-02 | End: 2024-12-02

## 2024-12-02 RX ORDER — DROPERIDOL 2.5 MG/ML
0.62 INJECTION, SOLUTION INTRAMUSCULAR; INTRAVENOUS ONCE AS NEEDED
Status: DISCONTINUED | OUTPATIENT
Start: 2024-12-02 | End: 2024-12-02 | Stop reason: HOSPADM

## 2024-12-02 RX ORDER — ROCURONIUM BROMIDE 10 MG/ML
INJECTION, SOLUTION INTRAVENOUS AS NEEDED
Status: DISCONTINUED | OUTPATIENT
Start: 2024-12-02 | End: 2024-12-02

## 2024-12-02 RX ORDER — ONDANSETRON HYDROCHLORIDE 2 MG/ML
INJECTION, SOLUTION INTRAVENOUS AS NEEDED
Status: DISCONTINUED | OUTPATIENT
Start: 2024-12-02 | End: 2024-12-02

## 2024-12-02 RX ORDER — PROPOFOL 10 MG/ML
INJECTION, EMULSION INTRAVENOUS AS NEEDED
Status: DISCONTINUED | OUTPATIENT
Start: 2024-12-02 | End: 2024-12-02

## 2024-12-02 RX ORDER — OXYCODONE HYDROCHLORIDE 5 MG/1
5 TABLET ORAL EVERY 6 HOURS PRN
Qty: 12 TABLET | Refills: 0 | Status: SHIPPED | OUTPATIENT
Start: 2024-12-02 | End: 2024-12-09

## 2024-12-02 RX ORDER — LIDOCAINE HYDROCHLORIDE 20 MG/ML
INJECTION, SOLUTION INFILTRATION; PERINEURAL AS NEEDED
Status: DISCONTINUED | OUTPATIENT
Start: 2024-12-02 | End: 2024-12-02

## 2024-12-02 RX ORDER — SODIUM CHLORIDE 0.9 G/100ML
IRRIGANT IRRIGATION AS NEEDED
Status: DISCONTINUED | OUTPATIENT
Start: 2024-12-02 | End: 2024-12-02 | Stop reason: HOSPADM

## 2024-12-02 ASSESSMENT — PAIN - FUNCTIONAL ASSESSMENT
PAIN_FUNCTIONAL_ASSESSMENT: 0-10

## 2024-12-02 ASSESSMENT — COLUMBIA-SUICIDE SEVERITY RATING SCALE - C-SSRS
2. HAVE YOU ACTUALLY HAD ANY THOUGHTS OF KILLING YOURSELF?: NO
6. HAVE YOU EVER DONE ANYTHING, STARTED TO DO ANYTHING, OR PREPARED TO DO ANYTHING TO END YOUR LIFE?: NO
1. IN THE PAST MONTH, HAVE YOU WISHED YOU WERE DEAD OR WISHED YOU COULD GO TO SLEEP AND NOT WAKE UP?: NO

## 2024-12-02 ASSESSMENT — PAIN SCALES - GENERAL
PAIN_LEVEL: 2
PAINLEVEL_OUTOF10: 0 - NO PAIN

## 2024-12-02 ASSESSMENT — PAIN DESCRIPTION - DESCRIPTORS: DESCRIPTORS: ACHING

## 2024-12-02 NOTE — ANESTHESIA PREPROCEDURE EVALUATION
Patient: Edgar Covarrubias III    Procedure Information       Date/Time: 12/02/24 0900    Procedures:       Repair Inguinal Hernia (Right)      Excision Lesion Skin Torso (Left)    Location: [facility] / [facility]    Surgeons: Luis BOWERS MD            Relevant Problems   Cardiac   (+) Benign essential HTN   (+) Hyperlipidemia      Pulmonary   (+) KAYLAH (obstructive sleep apnea)      Musculoskeletal   (+) DJD (degenerative joint disease), lumbar      HEENT   (+) Chronic sinusitis       Clinical information reviewed:   Tobacco  Allergies  Meds   Med Hx  Surg Hx   Fam Hx  Soc Hx        NPO Detail:  No data recorded     Physical Exam    Airway  Mallampati: II     Cardiovascular   Rhythm: regular  Rate: normal     Dental    Pulmonary - normal exam     Abdominal - normal exam             Anesthesia Plan    History of general anesthesia?: yes  History of complications of general anesthesia?: no    ASA 3     general     intravenous induction   Postoperative administration of opioids is intended.  Anesthetic plan and risks discussed with patient.

## 2024-12-02 NOTE — NURSING NOTE
Patient up to void x 1; clear yellow urine observed. Discharge instructions provided and reviewed with patient and wife. Questions answered. Patient pain free since coming to [facility]. Dr. Ramon's number given for patient to call for follow up appt in 2-3 weeks.

## 2024-12-02 NOTE — DISCHARGE INSTRUCTIONS
Hernia Repair Discharge Instructions    About this topic  A hernia starts with a weak area in your belly wall. Then, the lining of your belly can push through the weak area and form a sack. Your bowels or other belly contents can get stuck in this sack. A hernia looks like a bulge or swelling on the outside of your belly and it can get bigger when you cough or strain. Surgery is often needed to treat a hernia.    What care is needed at home?  Ask your doctor what you need to do when you go home. Make sure you ask questions if you do not understand what the doctor says. This way you will know what you need to do.  Talk with your doctor about how much you can lift. Your doctor may want you to gradually return to your normal activity or they may restrict how much you can lift for a period of time.  Talk to your doctor about how to care for your cut site. Ask your doctor about:  When you should remove or change your bandages  When you may take a bath or shower. Do not soak in a bath.  When you may go back to your normal activities like work or driving  Be sure to wash your hands before touching your wound or dressing.  You may feel better in loose-fitting clothing.  Place an ice pack or a bag of frozen peas wrapped in a towel over the painful part. Never put ice right on the skin. Do not leave the ice on more than 10 to 15 minutes at a time. This may help with pain and swelling.  What follow-up care is needed?  Your doctor may ask you to make visits to the office to check on your progress. Be sure to keep these visits.  If you have stitches or staples, you will need to have them taken out. Your doctor will often want to do this in 1 to 2 weeks. If the doctor used skin glue, the glue will fall off on its own.  What drugs may be needed?  The doctor may order drugs to:  Help with pain  Prevent infection  Soften stools  Will physical activity be limited?  Be sure to walk 3 to 4 times each day. Try to walk a little longer  each day.  You may often return to work in 1 to 2 weeks. Talk with your doctor about how much you can lift.  You may need to rest for a while. Talk to your doctor if you run, work out, or play sports.  Do light household chores only, such as washing dishes or helping with meals.  What problems could happen?  Infection  Bleeding  Wound opens or has more drainage  Hernia comes back  When do I need to call the doctor?  Signs of infection. These include a fever of 100.4°F (38°C) or higher, chills, pain with passing urine, or wound that will not heal.  Signs of wound infection. These include swelling, redness, warmth around the wound; too much pain when touched; yellowish, greenish, or bloody discharge; foul smell coming from the cut site; cut site opens up.  Continuous bleeding from the cut site.  Bulging at the cut site where the surgery was done.  No bowel movement by 2 to 3 days after surgery.  Continuous vomiting or severe abdominal pain and swelling.  Pain gets worse and does not improve when drugs are taken.  Teach Back: Helping You Understand  The Teach Back Method helps you understand the information we are giving you. After you talk with the staff, tell them in your own words what you learned. This helps to make sure the staff has described each thing clearly. It also helps to explain things that may have been confusing. Before going home, make sure you can do these:  I can tell you about my surgery.  I can tell you how to care for my cut site.  I can tell you what I will do if I have swelling, redness, or warmth around my wound.  Where can I learn more?  [employer]  https://www.facs.org/~/media/files/education/patient%20ed/groin_hernia.ashx  Familydoctor.org  https://familydoctor.org/condition/hernia/  [employer]  http://kidshealth.org/teen/sexual_health/guys/hernias.html  [facility] Choices  http://www.nhs.uk/conditions/hernia/pages/introduction.aspx  Last Reviewed Date  2023-03-03    General  Anesthesia Discharge Instructions    About this topic  You may need general anesthesia if you need to be asleep during a procedure. Your doctor will use drugs to block the signals that go from your nerves to your brain. Doctors give general anesthesia during a surgery or procedure to:  Allow you to sleep  Help your body be still  Relax your muscles  Help you to relax and be pain free  Keep you from remembering the surgery  Let the doctor manage your airway, breathing, and blood flow  The doctor or nurse anesthetist gives general anesthesia by a shot into your vein. Sometimes, you may breathe in a gas through a mask placed over your face.  What care is needed at home?  Ask your doctor what you need to do when you go home. Make sure you ask questions if you do not understand what the doctor says.  Your doctor may give you drugs to prevent or treat an upset stomach from the anesthetic. Take them as ordered.  If your throat is sore, suck on ice chips or popsicles to ease throat pain.  Put 2 to 3 pillows under your head and back when you lie down to help you breathe easier.  For the first 24 to 48 hours:  Do not operate heavy or dangerous machinery.  Do not make major decisions or sign important papers. You may not be able to think clearly.  Avoid beer, wine, or mixed drinks.  You are at a higher risk of falling for at least 24 hours after general anesthesia.  Take extra care when you get up.  Do not change positions quickly.  Do not rush when you need to go to the bathroom or to answer the phone.  Ask for help if you feel unsteady when you try to walk.  Wear shoes with non-slip soles and low heels.  What follow-up care is needed?  Your doctor may ask you to come back to the office to check on your progress. Be sure to keep these visits.  If you have stitches that do not dissolve or staples, you will need to have them removed. Your doctor will want to do this in 1 to 2 weeks. If the doctor used skin glue, the glue will  fall off on its own.  What drugs may be needed?  The doctor may order drugs to:  Help with pain  Treat an upset stomach or throwing up  Will physical activity be limited?  You will not be allowed to drive right away after the procedure. Ask a family member or a friend to drive you home.  Avoid trying to get out of bed without help until you are sure of your balance.  You may have to limit your activity. Talk to your doctor about if you need to limit how much you lift or limit exercise after your procedure.  What changes to diet are needed?  Start with a light diet when you are fully awake. This includes things that are easy to swallow like soups, pudding, jello, toast, and eggs. Slowly progress to your normal diet.  What problems could happen?  Low blood pressure  Breathing problems  Upset stomach or throwing up  Dizziness  Blood clots  Infection  When do I need to call the doctor?  Trouble breathing  Upset stomach or throwing up more than 3 times in the next 2 days  Dizziness  Teach Back: Helping You Understand  The Teach Back Method helps you understand the information we are giving you. After you talk with the staff, tell them in your own words what you learned. This helps to make sure the staff has described each thing clearly. It also helps to explain things that may have been confusing. Before going home, make sure you can do these:  I can tell you about my procedure.  I can tell you if I need to follow up with my doctor.  I can tell you what is good for me to eat and drink the next day.  I can tell you what I would do if I have trouble breathing, an upset stomach, or dizziness.  Where can I learn more?  [employer]  https://www.nigms.nih.gov/education/pages/factsheet_Anesthesia.aspx  [facility] Choices  http://www.nhs.uk/conditions/Anaesthetic-general/Pages/Definition.aspx  Last Reviewed Date  2020-04-22  Physician phone list provided

## 2024-12-02 NOTE — OP NOTE
Repair Inguinal Hernia (R), Excision Lesion Skin Torso (L) Operative Note     Date: 2024  OR Location: [facility]    Name: Edgar Covarrubias III, : 1970, Age: 54 y.o., MRN: [id number], Sex: male    Diagnosis  Pre-op Diagnosis      * Chronic groin pain, right [R10.31, G89.29] Post-op Diagnosis     * Chronic groin pain, right [R10.31, G89.29]     Procedures  Repair Inguinal Hernia  78372 - IN RPR 1ST INGUN HRNA AGE 5 YRS/> REDUCIBLE    Excision Lesion Skin Torso  80462 - IN EXC B9 LESION MRGN XCP SK TG T/A/L 0.5 CM/<      Surgeons      * Luis Ramon V - Primary    Resident/Fellow/Other Assistant:  Surgeons and Role:     * Dorina Peace PA-C - Assisting    Staff:   Circulator: Ayo Colmenares Person: Peggy Jacome Circulator: Beena    Anesthesia Staff: Anesthesiologist: Aristides Ni DO; Demario Ruiz DO    Procedure Summary  Anesthesia: General  ASA: III  Estimated Blood Loss: Minimal mL  Intra-op Medications:   Administrations occurring from 0900 to 1045 on 24:   Medication Name Total Dose   dexAMETHasone (Decadron) 4 mg/mL 4 mg   fentaNYL PF 0.05 mg/mL 100 mcg   lactated Ringer's infusion Cannot be calculated   lidocaine (Xylocaine) injection 2 % 60 mg   midazolam (Versed) 1 mg/1 mL 2 mg   phenylephrine 100 mcg/mL syringe 10 mL (prefilled) 200 mcg   propofol (Diprivan) injection 10 mg/mL 200 mg   rocuronium (ZeMuron) 50 mg/5 mL injection 60 mg   ceFAZolin (Ancef) 2 g in dextrose (iso)  mL 2 g              Anesthesia Record               Intraprocedure I/O Totals          Intake    ceFAZolin (Ancef) 2 g in dextrose (iso)  mL 100.00 mL    Total Intake 100 mL          Specimen:   ID Type Source Tests Collected by Time   1 : LEFT HIP MASS Tissue SKIN EXCISION SURGICAL PATHOLOGY EXAM Luis BOWERS MD 2024 1126                 Drains and/or Catheters: * None in log *    Tourniquet Times:         Implants:  Implants       Type Name Action Serial No.      Implant PATCH, PLUG, KNITTED,  PERFIX, LARGE, 1.6 X 1.9 IN, POLYPROPYLENE - [id number] Implanted               Findings: Large preperitoneal fat pad, left hip mass consistent with lipoma    Indications: Edgar Covarrubias III is an 54 y.o. male who is having surgery for Chronic groin pain, right [R10.31, G89.29].     The patient was seen in the preoperative area. The risks, benefits, complications, treatment options, non-operative alternatives, expected recovery and outcomes were discussed with the patient. The possibilities of reaction to medication, pulmonary aspiration, injury to surrounding structures, bleeding, recurrent infection, the need for additional procedures, failure to diagnose a condition, and creating a complication requiring transfusion or operation were discussed with the patient. The patient concurred with the proposed plan, giving informed consent.  The site of surgery was properly noted/marked if necessary per policy. The patient has been actively warmed in preoperative area. Preoperative antibiotics have been ordered and given within 1 hours of incision. Venous thrombosis prophylaxis have been ordered including bilateral sequential compression devices and chemical prophylaxis    Procedure Details: Patient was brought to the OR laid supine.  Preoperative huddle was performed and all team members participated.  Patient was then placed under general anesthesia.  Abdomen genitalia and left hip were prepped and draped in standard surgical fashion.  Timeout procedures were observed and we elected to proceed at this time    Local anesthetic was instilled medial to the right anterior superior iliac spine and nerve block achieved.  Local anesthetic instilled in the right groin skin incision made dissection was carried down using cautery superficial venous structure was cauterized.  External oblique was carefully defined and opened widely the cord was mobilized off the floor the canal controlled with a Penrose drain cremasteric muscle was  divided and the cord was gently dissected no sac was noted however working proximally a large amount of preperitoneal fat consistent with the cord lipoma was dissected and  from the cord and returned intra-abdominally.  Large mesh plug was placed and secured at 2 points using 0 PDS sutures.  The floor of the inguinal canal was weak therefore the flat portion of the mesh was sutured to the pubic tubercle medially to strong tissue and laterally to the shelving edge of the inguinal ligament.  The tails were reapproximated around cord was released.  Good repair was affected no nerve injury was noted.  The external bleak was run closed using a 3-0 Vicryl skin closed with 3-0 Vicryl for Monocryl and skin adhesive.    Local anesthetic had already been injected in the subcutaneous mass on the left hip skin incision made and this lipomatous mass was resected using cautery and blunt dissection closed using 3-0 Vicryl 4-0 Monocryl.  Skin adhesive was placed patient tolerated procedure well discussed with wife in detail  Complications:  None; patient tolerated the procedure well.    Disposition: PACU - hemodynamically stable.  Condition: stable                 Additional Details:     Attending Attestation: I performed the procedure.    Luis Ramon V  Phone Number: [phone]

## 2024-12-02 NOTE — ANESTHESIA POSTPROCEDURE EVALUATION
Patient: Edgar Covarrubias III    Procedure Summary       Date: 12/02/24 Room / Location: [facility] OR [facility] / [facility]    Anesthesia Start: 0955 Anesthesia Stop: 1157    Procedures:       Repair Inguinal Hernia (Right)      Excision Lesion Skin Torso (Left) Diagnosis:       Chronic groin pain, right      (Chronic groin pain, right [R10.31, G89.29])    Surgeons: Luis BOWERS MD Responsible Provider: Aristides Ni DO    Anesthesia Type: general ASA Status: 3            Anesthesia Type: general    Vitals Value Taken Time   /76 12/02/24 1208   Temp 36 °C (96.8 °F) 12/02/24 1153   Pulse 72 12/02/24 1208   Resp 10 12/02/24 1208   SpO2 97 % 12/02/24 1208   Vitals shown include unfiled device data.    Anesthesia Post Evaluation    Patient location during evaluation: PACU  Patient participation: complete - patient participated  Level of consciousness: awake  Pain score: 2  Pain management: adequate  Airway patency: patent  Cardiovascular status: acceptable, blood pressure returned to baseline and hemodynamically stable  Respiratory status: acceptable, nonlabored ventilation, spontaneous ventilation and face mask  Hydration status: acceptable  Postoperative Nausea and Vomiting: none        No notable events documented.

## 2024-12-02 NOTE — ANESTHESIA PROCEDURE NOTES
Airway  Date/Time: 12/2/2024 10:06 AM  Urgency: elective    Airway not difficult    Staffing  Performed: attending   Authorized by: Demario Ruiz DO    Performed by: Demario Ruiz DO  Patient location during procedure: OR    Indications and Patient Condition  Indications for airway management: anesthesia  Sedation level: deep  Preoxygenated: yes      Final Airway Details  Final airway type: endotracheal airway      Successful airway: ETT     Successful intubation technique: video laryngoscopy  Facilitating devices/methods: intubating stylet  Blade: Cari  Blade size: #4  ETT size (mm): 7.5  Cormack-Lehane Classification: grade III - view of epiglottis only  Measured from: lips  ETT to lips (cm): 22  Number of attempts at approach: 1

## 2024-12-12 LAB
LABORATORY COMMENT REPORT: NORMAL
PATH REPORT.FINAL DX SPEC: NORMAL
PATH REPORT.GROSS SPEC: NORMAL
PATH REPORT.RELEVANT HX SPEC: NORMAL
PATH REPORT.TOTAL CANCER: NORMAL

## 2024-12-13 NOTE — PROGRESS NOTES
"   Patient: Edgar Covarrubias III  : 1970 AGE: 54 y.o. SEX:male   MRN: [id number]   Provider: ALISSON Miller     Location [facility]   Service Date: 2024     PCP: Gt Harris DO   Referred by: No ref. provider found          [facility]  Follow Up Visit Note      HISTORY OF PRESENT ILLNESS     Edgar Covarrubias III is a 54 y.o. male with a h/o KAYLAH and deviated septum, HLD  who presents to [facility].    24: Has been try to do better with CPAP but still inconsistent with use. Recently went out of town for Thanksgiving and did not take his machine with him. Other nights he just doesn't want to bother to put mask on. Does not notice benefit with use, but does not think it makes sleep any worse either. No longer feeling air hunger on initiation. Comfortable with NP mask and pressure. Continues to report chronic nasal congestion which is his biggest complaint, always feels like \"one side of nose is blocked\". Not consistent with nasal spray or antihistamine use. Admits to grabbing \"whataver nasal spray is nearby\" when he needs it. ----> Refer to ENT     10/30/24: First follow up since starting PAP therapy. Still struggling with use, feels air hunger on initiation, has not made part of nightly routine, and nasal congestion. Was started on flonase but stopped use as he does not like smell so now using Afrin 2x/week. Reports TST 6 hours/night on average. Comfortable with NP mask. PERCEIVED BENEFITS OF PAP: refreshing sleep. ----> Adjust from 5-15 to 6-15, turned RAMP off. Start Sensimist (stop Afrin use)       24: NPV with concerns of KAYLAH management, found on recent sleep study. Reports loud snoring, witnessed apneas, night time awakenings, un refreshed sleep. ----> APAP 5-15 CWP ordered via [facility].    SLEEP STUDY HISTORY (personally reviewed raw data such as interpretation report, data sheet, hypnogram, and " titration table if available and applicable)  -HSAT 6/17/24: showing severe KAYLAH with MARTA 39, DANIAL 12, SpO2 ariana 78% SpO2 <=88% for 34 min      Media Information          SLEEP-WAKE SCHEDULE    Sleep Patterns: He does have a usual bed partner. In terms of the patient's sleep/wake cycle, he generally gets into bed at approximately 10:30 PM.  He turns off light to sleep, and his latency to sleep onset after lights out is 40min. During the night, the patient generally awakens 1-3 times nightly. These awakenings are usually brief in duration. Final wake time on weekday mornings is around 7:15 AM.    Compared to weekdays (work week), the patient's sleep schedule is  different on the weekends (off work). Usual bed time on the weekend is around 1-2 AM. Final wake time on weekend mornings is around 9 AM.     Breathing during sleep: snoring, witnessed apneas, and nasal congestion  Behaviors at night: No   Sleep paralysis: No   Hypnogogic or hypnopompic hallucinations: No   Cataplexy: No     Leg symptoms and timing:  - Sensations: Patient does not have unusual sensations in their extremities that cause an urge to move them   - Movement: Patient has not been told that their legs kick or jerk during sleep    Daytime Symptoms:  On awakening patient reports: wake unrefreshed, feels sleepy, rare morning headaches, sometimes morning dry mouth, and hard to get out of bed  Patient report some daytime symptoms including: DAYTIME SYMPTOMS: reports sleep inertia    Sleep environment:  Preferred sleep position: back  Room is dark: Yes  Room is quiet: Yes  Room is cool: Yes  Bed comfort: good    SLEEP HABITS  Caffeine consumption: Yes, 1-2 cups/day  Alcohol consumption: Yes, occasional- drinks bourbon on the weekends  Smoking: No  Marijuana: No  Sleep aids: denies     WEIGHT: gained 30lbs in 4 years      ESS: 1    REVIEW OF SYSTEMS     All other systems have been reviewed and are negative.    ALLERGIES     Allergies   Allergen Reactions     "Bee Pollen Runny nose       MEDICATIONS     Current Outpatient Medications   Medication Sig Dispense Refill    acetaminophen (Tylenol) 500 mg tablet Take by mouth.      aspirin-acetaminophen-caffeine (Excedrin Migraine) 250-250-65 mg tablet Take 1 tablet by mouth every 6 hours if needed for headaches.      ibuprofen 800 mg tablet Take 1 tablet (800 mg) by mouth every 8 hours if needed.      rosuvastatin (Crestor) 5 mg tablet Take 1 tablet (5 mg) by mouth once daily. 30 tablet 0    fluticasone (Flonase Sensimist) 27.5 mcg/actuation nasal spray Administer 2 sprays into each nostril once daily at bedtime. (Patient not taking: Reported on 12/30/2024) 10 g 11    fluticasone (Flonase) 50 mcg/actuation nasal spray Administer 2 sprays into each nostril once daily. Shake gently. Before first use, prime pump. After use, clean tip and replace cap. (Patient not taking: Reported on 12/30/2024) 16 g 0     No current facility-administered medications for this visit.       PAST HISTORIES     PERTINENT PAST MEDICAL HISTORY: See HPI    PERTINENT PAST SURGICAL HISTORY for Sleep Medicine:  Nose surgery when young     PERTINENT FAMILY HISTORY for Sleep Medicine:  sleep apnea on PAP- father    PERTINENT SOCIAL HISTORY:  He  reports that he has quit smoking. His smoking use included cigarettes. He has never used smokeless tobacco. He reports current alcohol use of about 28.0 standard drinks of alcohol per week. He reports that he does not use drugs. He currently lives with spouse.    Active Problems, Allergy List, Medication List, and PMH/PSH/FH/Social Hx have been reviewed and reconciled in chart. No significant changes unless documented in the pertinent chart section. Updates made when necessary.     PHYSICAL EXAM     VITAL SIGNS: /90   Pulse 98   Temp 36.2 °C (97.1 °F)   Resp 16   Ht 1.676 m (5' 6\")   Wt 76.2 kg (168 lb)   SpO2 97%   BMI 27.12 kg/m²     CURRENT WEIGHT:   Vitals:    12/30/24 1552   Weight: 76.2 kg (168 lb) " "    PREVIOUS WEIGHTS:  Wt Readings from Last 3 Encounters:   12/30/24 76.2 kg (168 lb)   12/19/24 70.8 kg (156 lb)   12/02/24 71 kg (156 lb 8.4 oz)     Constitutional: Alert and oriented, cooperative, no obvious distress   HEENT: Non icteric or anemic, EOM WNL bilaterally   Neck: Supple, no JVD, no goiter, no adenopathy, no rigidity     RESULTS/DATA     No results found for: \"IRON\", \"TRANSFERRIN\", \"IRONSAT\", \"TIBC\", \"FERRITIN\"    Bicarbonate   Date Value Ref Range Status   06/03/2024 31 21 - 32 mmol/L Final       ASSESSMENT/PLAN     Mr. Covarrubias is a 54 y.o. male and He was referred to the [facility] for evaluation of KAYLAH    Problem List, Orders, Assessment, Recommendations:    # KAYLAH, severe  - HSAT 6/17/24: showing severe KAYLAH with MARTA 39, DANIAL 12, SpO2 ariana 78% SpO2 <=88% for 34 min    12/30/24:  - again with poor compliance but using for longer hours on nights used, residual AHI at goal with use (2.3)----> emphasized importance of increasing usage and duration    10/30/24:  - Retrieved and personally reviewed recent PAP adherence download data today. See HPI.  -  poor compliance to PAP therapy, residual AHI at goal with use (1.9) ----> emphasized importance of increasing usage and duration  -  Adjusted from 5-15 to 6-15, turned RAMP off. Changes made in [facility] today   -  [facility]- [facility]  - diet, exercise, and weight loss were emphasized today in clinic, as were non-supine sleep, avoiding alcohol in the late evening, and driving or operating heavy machinery when sleepy. Patient verbalized understanding.     7/16/24:  - will start APAP 5-15 cwp via D[facility]- [facility] (please expedite set up due to KAYLAH severity)   - Recommend starting with nasal or NP interface, may add chin strap or mouth tape   -Sleep apnea, PAP therapy education as well as the tips to be successful with PAP treatment was provided at length in clinic today. Patient verbalized understanding.  - Discussed 30-day mask guarantee and " insurance requirement regarding PAP compliance and follow-up.   -Diet, exercise, and weight loss were emphasized today in clinic, as were non-supine sleep, avoiding alcohol in the late evening, and driving or operating heavy machinery when sleepy.   - patient will follow-up in 2-3 months and bring equipment to the follow-up clinic      # ALLERGIC RHINITIS / SEASONAL ALLERGIES / CHRONIC NASAL CONGESTION  - Start sensimist nasal spray 2 sprays per nostril once daily 1 hour before bedtime Or Dymista nasal spray 1 spray per nostril twice a day  - Can take OTC allergy medications as well for symptom control (Claritin, Zyrtec, Allegra or Xyzal)  - Educated patient on Proper use of intranasal steroid spray    - Stop Afrin use   - Referred to ENT for further evaluation/treatment     #HTN  BP Readings from Last 1 Encounters:   12/30/24 137/90     - doing well, asymptomatic, denies any headache, blurry vision, chest pain, palpitation, dizziness, lightheadedness, or syncopal episodes  - discussed at length the impact of untreated KAYLAH and BP control  - supportive management: low salt DASH diet (less than 2000 mg sodium intake daily), moderate intensity aerobic exercise at least 30 minutes 5 days per week, reduce stress, quit smoking, limit alcohol, lose weight, and monitor BP once daily  - continue current management and follow-up with PCP     #Overweight  BMI Readings from Last 1 Encounters:   12/30/24 27.12 kg/m²     - Encouraged healthy weight loss via diet and exercise  - Weight loss can help in the long term treatment of KAYLAH.  - Defer management to PCP     All of patient's questions were answered. He verbalizes understanding and agreement with my assessment and plan.    Disposition    Return to clinic in 3-4 months     I personally spent 35 minutes today (exclusive of procedures) providing care for this patient, including preparation, face to face time, EMR documentation and other services such as review of medical  records, diagnostic results, patient education, counseling, and coordination of care.

## 2024-12-19 ENCOUNTER — APPOINTMENT (OUTPATIENT)
Dept: SURGERY | Facility: CLINIC | Age: 54
End: 2024-12-19
Payer: COMMERCIAL

## 2024-12-19 VITALS — HEIGHT: 66 IN | WEIGHT: 156 LBS | BODY MASS INDEX: 25.07 KG/M2

## 2024-12-19 DIAGNOSIS — R10.31 CHRONIC GROIN PAIN, RIGHT: Primary | ICD-10-CM

## 2024-12-19 DIAGNOSIS — G89.29 CHRONIC GROIN PAIN, RIGHT: Primary | ICD-10-CM

## 2024-12-19 PROCEDURE — 3008F BODY MASS INDEX DOCD: CPT | Performed by: SURGERY

## 2024-12-19 PROCEDURE — 99024 POSTOP FOLLOW-UP VISIT: CPT | Performed by: SURGERY

## 2024-12-19 NOTE — PROGRESS NOTES
Postop day 17 status post right inguinal hernia repair and excision of left hip preoperative pain improved however still mild pain in the incision and below    Incisions healed no recurrence no seroma    Slow recovery as expected    See back 6 weeks for recheck    At 2 weeks to time off will need it due to discomfort

## 2024-12-30 ENCOUNTER — APPOINTMENT (OUTPATIENT)
Facility: CLINIC | Age: 54
End: 2024-12-30
Payer: COMMERCIAL

## 2024-12-30 VITALS
HEIGHT: 66 IN | WEIGHT: 168 LBS | RESPIRATION RATE: 16 BRPM | OXYGEN SATURATION: 97 % | DIASTOLIC BLOOD PRESSURE: 90 MMHG | BODY MASS INDEX: 27 KG/M2 | TEMPERATURE: 97.1 F | SYSTOLIC BLOOD PRESSURE: 137 MMHG | HEART RATE: 98 BPM

## 2024-12-30 DIAGNOSIS — E66.3 OVERWEIGHT (BMI 25.0-29.9): ICD-10-CM

## 2024-12-30 DIAGNOSIS — G47.33 OSA (OBSTRUCTIVE SLEEP APNEA): Primary | ICD-10-CM

## 2024-12-30 DIAGNOSIS — R09.81 NASAL CONGESTION: ICD-10-CM

## 2024-12-30 DIAGNOSIS — I10 PRIMARY HYPERTENSION: ICD-10-CM

## 2024-12-30 DIAGNOSIS — J34.89 NASAL OBSTRUCTION: ICD-10-CM

## 2024-12-30 PROCEDURE — 3075F SYST BP GE 130 - 139MM HG: CPT | Performed by: NURSE PRACTITIONER

## 2024-12-30 PROCEDURE — 3080F DIAST BP >= 90 MM HG: CPT | Performed by: NURSE PRACTITIONER

## 2024-12-30 PROCEDURE — 99214 OFFICE O/P EST MOD 30 MIN: CPT | Performed by: NURSE PRACTITIONER

## 2024-12-30 PROCEDURE — 1036F TOBACCO NON-USER: CPT | Performed by: NURSE PRACTITIONER

## 2024-12-30 PROCEDURE — 3008F BODY MASS INDEX DOCD: CPT | Performed by: NURSE PRACTITIONER

## 2024-12-30 ASSESSMENT — SLEEP AND FATIGUE QUESTIONNAIRES
HOW LIKELY ARE YOU TO NOD OFF OR FALL ASLEEP WHILE SITTING AND READING: WOULD NEVER DOZE
ESS-CHAD TOTAL SCORE: 1
HOW LIKELY ARE YOU TO NOD OFF OR FALL ASLEEP WHILE SITTING AND TALKING TO SOMEONE: WOULD NEVER DOZE
HOW LIKELY ARE YOU TO NOD OFF OR FALL ASLEEP IN A CAR, WHILE STOPPED FOR A FEW MINUTES IN TRAFFIC: WOULD NEVER DOZE
HOW LIKELY ARE YOU TO NOD OFF OR FALL ASLEEP WHILE WATCHING TV: WOULD NEVER DOZE
HOW LIKELY ARE YOU TO NOD OFF OR FALL ASLEEP WHEN YOU ARE A PASSENGER IN A CAR FOR AN HOUR WITHOUT A BREAK: WOULD NEVER DOZE
HOW LIKELY ARE YOU TO NOD OFF OR FALL ASLEEP WHILE LYING DOWN TO REST IN THE AFTERNOON WHEN CIRCUMSTANCES PERMIT: SLIGHT CHANCE OF DOZING
SITING INACTIVE IN A PUBLIC PLACE LIKE A CLASS ROOM OR A MOVIE THEATER: WOULD NEVER DOZE
HOW LIKELY ARE YOU TO NOD OFF OR FALL ASLEEP WHILE SITTING QUIETLY AFTER LUNCH WITHOUT ALCOHOL: WOULD NEVER DOZE

## 2025-01-09 NOTE — H&P
History Of Present Illness  Edgar Covarrubias III is a 54 y.o. male presenting with right groin bulge and left hip mass.     Past Medical History  Past Medical History:   Diagnosis Date    Abscess of sigmoid colon due to diverticulitis 07/28/2023    Acute gastritis without bleeding 03/04/2020    Acute gastritis without hemorrhage, unspecified gastritis type    Anesthesia of skin     Numbness and tingling    Bowel perforation (Multi)     Bursitis of left shoulder 02/19/2021    Subacromial bursitis of left shoulder joint    Bursitis of unspecified shoulder 02/19/2021    Subacromial bursitis    Digestive system disorder 07/28/2023    Disease of digestive system, unspecified     Digestive problems    Diverticulitis     Diverticulitis of large intestine with perforation and abscess without bleeding 06/03/2020    Abscess of sigmoid colon due to diverticulitis    Epididymitis 05/13/2021    Epididymitis, left    Hyperlipidemia     Iron deficiency anemia 07/28/2023    Left lower quadrant pain 03/04/2020    Abdominal pain, LLQ (left lower quadrant)    Meralgia paresthetica, left lower limb 05/13/2021    Meralgia paresthetica of left side    Numbness and tingling sensation of skin 07/28/2023    Other acute postprocedural pain 08/18/2020    Post-operative pain    Other shoulder lesions, unspecified shoulder 02/19/2021    Rotator cuff tendonitis    Other symptoms and signs involving the musculoskeletal system 01/10/2021    Shoulder weakness    Pain in right shoulder 10/06/2020    Shoulder pain, right    Pain in unspecified shoulder 01/10/2021    Shoulder pain    Personal history of diseases of the blood and blood-forming organs and certain disorders involving the immune mechanism 08/12/2020    History of iron deficiency anemia    Personal history of nicotine dependence 09/14/2020    History of tobacco use disorder    Personal history of other diseases of the digestive system 08/12/2020    History of constipation    Personal history  "of other diseases of the digestive system 12/23/2020    History of small bowel obstruction    Personal history of other diseases of the digestive system 08/06/2020    History of diverticulitis of colon    Personal history of other endocrine, nutritional and metabolic disease 09/14/2020    History of vitamin D deficiency    Right upper quadrant pain 05/06/2020    Colicky right upper quadrant pain    Sleep apnea     CPAP    Small bowel obstruction (Multi) 07/28/2023    Subacromial bursitis of left shoulder joint 07/28/2023    Wears glasses        Surgical History  Past Surgical History:   Procedure Laterality Date    BOWEL RESECTION      COLOSTOMY      COLOSTOMY CLOSURE      OTHER SURGICAL HISTORY  05/06/2020    Abscess incision and drainage    OTHER SURGICAL HISTORY  11/25/2020    Abdominal surgery    US ASPIRATION INJECTION INTERMEDIATE JOINT  10/05/2020    US ASPIRATION INJECTION INTERMEDIATE JOINT 10/5/2020 [facility]    US ASPIRATION INJECTION INTERMEDIATE JOINT  01/08/2021    US ASPIRATION INJECTION INTERMEDIATE JOINT 1/8/2021 [facility]        Social History  He reports that he has quit smoking. His smoking use included cigarettes. He has never used smokeless tobacco. He reports current alcohol use of about 28.0 standard drinks of alcohol per week. He reports that he does not use drugs.    Family History  Family History   Problem Relation Name Age of Onset    Arthritis Mother      Arthritis Father      Other (dry eyes) Father      Other (heart surgery) Father      Other (heart burn) Father          Allergies  Bee pollen    Review of Systems   Gastrointestinal:         Right groin bulge left hip mass        Physical Exam     Last Recorded Vitals  Blood pressure 135/87, pulse 75, temperature 36.4 °C (97.5 °F), temperature source Temporal, resp. rate 16, height 1.676 m (5' 6\"), weight 71 kg (156 lb 8.4 oz), SpO2 96%.    Relevant Results        Tender right inguinal hernia reducible left hip " lipoma 3 x 3 cm    Physical Exam  Constitutional:       Appearance: Normal appearance.   HENT:      Head: Normocephalic and atraumatic.      Mouth/Throat:      Pharynx: Oropharynx is clear.   Eyes:      Pupils: Pupils are equal, round, and reactive to light.   Cardiovascular:      Rate and Rhythm: Normal rate and regular rhythm.   Pulmonary:      Effort: Pulmonary effort is normal.      Breath sounds: Normal breath sounds.   Abdominal:      General: Abdomen is flat. Bowel sounds are normal.      Palpations: Abdomen is soft.   Musculoskeletal:         General: Normal range of motion.      Cervical back: Normal range of motion.   Skin:     General: Skin is warm.   Neurological:      General: No focal deficit present.      Mental Status: She is alert. Mental status is at baseline.   Psychiatric:         Mood and Affect: Mood normal.        Assessment/Plan   Assessment & Plan  Chronic groin pain, right      Symptomatic right inguinal hernia left hip lipoma plan excision and repair as detailed in office note       I spent  minutes in the professional and overall care of this patient.      Luis Ramon MD

## 2025-01-28 ENCOUNTER — APPOINTMENT (OUTPATIENT)
Facility: CLINIC | Age: 55
End: 2025-01-28
Payer: COMMERCIAL

## 2025-01-28 VITALS — BODY MASS INDEX: 29.52 KG/M2 | WEIGHT: 177.2 LBS | HEIGHT: 65 IN

## 2025-01-28 DIAGNOSIS — J34.89 NASAL OBSTRUCTION: Primary | ICD-10-CM

## 2025-01-28 DIAGNOSIS — R09.81 NASAL CONGESTION: ICD-10-CM

## 2025-01-28 PROCEDURE — 3008F BODY MASS INDEX DOCD: CPT | Performed by: OTOLARYNGOLOGY

## 2025-01-28 PROCEDURE — 1036F TOBACCO NON-USER: CPT | Performed by: OTOLARYNGOLOGY

## 2025-01-28 PROCEDURE — 31231 NASAL ENDOSCOPY DX: CPT | Performed by: OTOLARYNGOLOGY

## 2025-01-28 PROCEDURE — 99213 OFFICE O/P EST LOW 20 MIN: CPT | Performed by: OTOLARYNGOLOGY

## 2025-01-28 RX ORDER — BENZOCAINE .13; .15; .5; 2 G/100G; G/100G; G/100G; G/100G
1-2 GEL ORAL DAILY
Qty: 8.6 G | Refills: 3 | Status: SHIPPED | OUTPATIENT
Start: 2025-01-28 | End: 2026-01-28

## 2025-01-28 ASSESSMENT — PATIENT HEALTH QUESTIONNAIRE - PHQ9
2. FEELING DOWN, DEPRESSED OR HOPELESS: NOT AT ALL
SUM OF ALL RESPONSES TO PHQ9 QUESTIONS 1 AND 2: 0
1. LITTLE INTEREST OR PLEASURE IN DOING THINGS: NOT AT ALL

## 2025-01-28 ASSESSMENT — PAIN SCALES - GENERAL: PAINLEVEL_OUTOF10: 0-NO PAIN

## 2025-01-29 NOTE — PROGRESS NOTES
"Referring Provider: Ana Luisa Tee, [place]*    History of Present Illness:    Edgar Covarrubias III is a 54 y.o. male, presenting for evaluation of nasal obstruction.  He has been diagnosed with obstructive sleep apnea and wears a CPAP.  He states that he has difficulty breathing through his nose at baseline but particularly at night when he sleeps.  He states that the CPAP is very uncomfortable.  He uses nasal pillows.  He has tried fluticasone which does not help his breathing through his nose.  His CPAP mask is utilizing nasal pillows alone.  He denies recurrent sinusitis.  He denies other sinonasal complaints.    ?  Review of Systems:    Review of symptoms was negative except for those stated including Cardiopulmonary, Genitourinary, Gastrointestinal, Psychological, Sleep pattern, Endocrine, Eyes, Neurologic, Musculoskeletal, Skin, Hematologic/Lymphatic and Allergic/Immunologic.     Medical History:    I have reviewed the patient's updated past medical history, surgical history, family history, social history, as well as current medications and allergies as of 1/28/2025. Changes to these items have been updated and marked as reviewed in the electronic medical record.    Physical Exam:    Vitals:  height is 1.651 m (5' 5\") and weight is 80.4 kg (177 lb 3.2 oz).   General: Patient doing well overall and is in no apparent distress.  Psych: Pleasant affect, and answers questions appropriately.  Head & Face: Symmetric facial movements  Eyes: Pupils equal, round, reactive.  Extraocular movements intact without gaze restrictions or nystagmus. No epiphora.  Ears:  External auditory canals are normal.  Tympanic membranes are clear.  No middle ear effusion is seen.  All middle ear landmarks are normal.  Nose: Anterior rhinoscopy revealed normal sinonasal mucosa. More posterior areas of the nasal cavity could not be completely examined.  Oral Cavity/Oropharynx:  Without lesions or masses to visual exam.  Neck: Supple without " lymphadenopathy.  Lungs: Non-labored, and without evidence of stridor.  Cardiac: Pulses are strong, well-perfused.  Extremities: Without gross evidence of clubbing, cyanosis, or edema.  Neuro: Cranial nerves II-XII grossly intact; Intact facial movements.    Procedure:  Rigid nasal endoscopy (00585)  Pre-procedure diagnosis/Indication for procedure:  To evaluate areas not visualized on anterior rhinoscopy   Anesthesia:  None  Description:  A 0-degree 3-mm rigid nasal endoscope was used to examine the left and right nasal cavities.  The nasal valve areas were examined for abnormalities or collapse.  The inferior and middle turbinates were evaluated.  The middle and superior meatuses, and the sphenoethmoid recesses were examined and inspected for mucopurulence and polyps. Once the endoscope was withdrawn, the patient was noted to have tolerated the procedure well without complications and was returned to ambulatory status.    Findings:    Severe caudal right-sided septal deviation.  The nasopharynx, nasal floor, and middle meatus are all clear.    Assessment:     Edgar Covarrubias III is a 54 y.o. male with septal deviation, nasal obstruction, obstructive sleep apnea    Plan:      I do not suspect that he has sinusitis but rather nasal airway obstruction which makes it quite difficult to adhere to CPAP..  I did advise him that septoplasty and nasal valve reconstruction would likely significantly improve his symptoms and that I would be more than happy to refer him to my facial plastic surgery colleagues however he is not interested in surgical management at this time.  He would like to opt for an alternative nasal steroid spray that he has not tried before so we will plan to go ahead and prescribe him Rhinocort and see how he does.  I do think that fixing his septum will also improve his adherence to CPAP I highly encouraged him to reconsider in the future.      Velma Conde MD, M.Eng.   of  Otolaryngology - Head & Neck Surgery  Division of Rhinology and Endoscopic Skull Base Surgery  [facility]

## 2025-01-30 ENCOUNTER — APPOINTMENT (OUTPATIENT)
Dept: SURGERY | Facility: CLINIC | Age: 55
End: 2025-01-30
Payer: COMMERCIAL

## 2025-01-30 VITALS
DIASTOLIC BLOOD PRESSURE: 86 MMHG | BODY MASS INDEX: 27.49 KG/M2 | HEIGHT: 65 IN | SYSTOLIC BLOOD PRESSURE: 137 MMHG | OXYGEN SATURATION: 95 % | WEIGHT: 165 LBS | HEART RATE: 102 BPM

## 2025-01-30 DIAGNOSIS — G89.29 CHRONIC GROIN PAIN, RIGHT: Primary | ICD-10-CM

## 2025-01-30 DIAGNOSIS — R10.31 CHRONIC GROIN PAIN, RIGHT: Primary | ICD-10-CM

## 2025-01-30 PROCEDURE — 3008F BODY MASS INDEX DOCD: CPT | Performed by: SURGERY

## 2025-01-30 PROCEDURE — 3079F DIAST BP 80-89 MM HG: CPT | Performed by: SURGERY

## 2025-01-30 PROCEDURE — 3075F SYST BP GE 130 - 139MM HG: CPT | Performed by: SURGERY

## 2025-01-30 PROCEDURE — 99024 POSTOP FOLLOW-UP VISIT: CPT | Performed by: SURGERY

## 2025-01-30 NOTE — PROGRESS NOTES
Overall significantly better back to work no discomfort at the excision site inguinal area with mild twinges but overall better      Incisions healed no early recurrence    Follow-up as needed

## 2025-03-14 NOTE — PROGRESS NOTES
"   Patient: Edgar Covarrubias III  : 1970 AGE: 54 y.o. SEX:male   MRN: [id number]   Provider: ALISSON Miller     Location [facility]   Service Date: 3/31/2025   PCP: Gt Harris DO   Referred by: No ref. provider found          [facility]  Follow Up Visit Note      HISTORY OF PRESENT ILLNESS     Edgar Covarrubias III is a 54 y.o. male with a h/o KAYLAH and deviated septum, HLD  who presents to [facility].    3/31/25: Doing better with PAP usage since last visit. Now using most nights with positive benefit. He is bothered by front tubing getting in the way and would like to try top of head tubing style. He has seen ENT. Now using Rhinocort a couple times per week which he has found helpful. Plans to follow up with ENT- plastics for possible surgical correction. ---> Airtouch N30i, sample mask provided       24: Has been try to do better with CPAP but still inconsistent with use. [place] [employer] and did not take his machine with him. Other nights he just doesn't want to bother to put mask on. Does not notice benefit with use, but does not think it makes sleep any worse either. No longer feeling air hunger on initiation. Comfortable with NP mask and pressure. Continues to report chronic nasal congestion which is his biggest complaint, always feels like \"one side of nose is blocked\". Not consistent with nasal spray or antihistamine use. Admits to grabbing \"whataver nasal spray is nearby\" when he needs it. ----> Refer to ENT     10/30/24: First follow up since starting PAP therapy. Still struggling with use, feels air hunger on initiation, has not made part of nightly routine, and nasal congestion. Was started on flonase but stopped use as he does not like smell so now using Afrin 2x/week. Reports TST 6 hours/night on average. Comfortable with NP mask. PERCEIVED BENEFITS OF PAP: refreshing " sleep. ----> Adjust from 5-15 to 6-15, turned RAMP off. Start Sensimist (stop Afrin use)       7/16/24: NPV with concerns of KAYLAH management, found on recent sleep study. Reports loud snoring, witnessed apneas, night time awakenings, un refreshed sleep. ----> APAP 5-15 CWP ordered via [facility].    SLEEP STUDY HISTORY (personally reviewed raw data such as interpretation report, data sheet, hypnogram, and titration table if available and applicable)  -HSAT 6/17/24: showing severe KAYLAH with MARTA 39, DANIAL 12, SpO2 ariana 78% SpO2 <=88% for 34 min    ESS: 0    REVIEW OF SYSTEMS     All other systems have been reviewed and are negative.    ALLERGIES     Allergies   Allergen Reactions    Bee Pollen Runny nose       MEDICATIONS     Current Outpatient Medications   Medication Sig Dispense Refill    acetaminophen (Tylenol) 500 mg tablet Take by mouth.      aspirin-acetaminophen-caffeine (Excedrin Migraine) 250-250-65 mg tablet Take 1 tablet by mouth every 6 hours if needed for headaches.      budesonide (Rhinocort AQ) 32 mcg/actuation nasal spray Administer 1-2 sprays into each nostril once daily. 8.6 g 3    ibuprofen 800 mg tablet Take 1 tablet (800 mg) by mouth every 8 hours if needed.      rosuvastatin (Crestor) 5 mg tablet Take 1 tablet (5 mg) by mouth once daily. 30 tablet 0     No current facility-administered medications for this visit.       PAST HISTORIES     PERTINENT PAST MEDICAL HISTORY: See HPI    PERTINENT PAST SURGICAL HISTORY for Sleep Medicine:  Nose surgery when young     PERTINENT FAMILY HISTORY for Sleep Medicine:  sleep apnea on PAP- father    PERTINENT SOCIAL HISTORY:  He  reports that he has quit smoking. His smoking use included cigarettes. He has never used smokeless tobacco. He reports current alcohol use of about 28.0 standard drinks of alcohol per week. He reports that he does not use drugs. He currently lives with spouse.    Active Problems, Allergy List, Medication List, and PMH/PSH/FH/Social Hx have been  "reviewed and reconciled in chart. No significant changes unless documented in the pertinent chart section. Updates made when necessary.     PHYSICAL EXAM     VITAL SIGNS: /87   Pulse 86   Temp 36.4 °C (97.5 °F)   Ht 1.651 m (5' 5\")   Wt 78 kg (172 lb)   SpO2 96%   BMI 28.62 kg/m²     CURRENT WEIGHT:   Vitals:    03/31/25 1554   Weight: 78 kg (172 lb)       PREVIOUS WEIGHTS:  Wt Readings from Last 3 Encounters:   03/31/25 78 kg (172 lb)   01/30/25 74.8 kg (165 lb)   01/28/25 80.4 kg (177 lb 3.2 oz)     Constitutional: Alert and oriented, cooperative, no obvious distress   HEENT: Non icteric or anemic, EOM WNL bilaterally   Neck: Supple, no JVD, no goiter, no adenopathy, no rigidity     RESULTS/DATA     No results found for: \"IRON\", \"TRANSFERRIN\", \"IRONSAT\", \"TIBC\", \"FERRITIN\"    Bicarbonate   Date Value Ref Range Status   06/03/2024 31 21 - 32 mmol/L Final       ASSESSMENT/PLAN     Mr. Covarrubias is a 54 y.o. male and He was referred to the [facility] for evaluation of KAYLAH    Problem List, Orders, Assessment, Recommendations:    # KAYLAH, severe  - HSAT 6/17/24: showing severe KAYLAH with MARTA 39, DANIAL 12, SpO2 ariana 78% SpO2 <=88% for 34 min    3/31/25:  - Fit with Airtouch N30i, sample mask provided   - Encouraged to continue increasing use and duration    12/30/24:  - again with poor compliance but using for longer hours on nights used, residual AHI at goal with use (2.3)----> emphasized importance of increasing usage and duration    10/30/24:  - Retrieved and personally reviewed recent PAP adherence download data today. See HPI.  -  poor compliance to PAP therapy, residual AHI at goal with use (1.9) ----> emphasized importance of increasing usage and duration  -  Adjusted from 5-15 to 6-15, turned RAMP off. Changes made in [facility] today   -  DME- [facility]  - diet, exercise, and weight loss were emphasized today in clinic, as were non-supine sleep, avoiding alcohol in the late evening, " and driving or operating heavy machinery when sleepy. Patient verbalized understanding.     7/16/24:  - will start APAP 5-15 cwp via DME- [facility] (please expedite set up due to KAYLAH severity)   - Recommend starting with nasal or NP interface, may add chin strap or mouth tape   -Sleep apnea, PAP therapy education as well as the tips to be successful with PAP treatment was provided at length in clinic today. Patient verbalized understanding.  - Discussed 30-day mask guarantee and insurance requirement regarding PAP compliance and follow-up.   -Diet, exercise, and weight loss were emphasized today in clinic, as were non-supine sleep, avoiding alcohol in the late evening, and driving or operating heavy machinery when sleepy.   - patient will follow-up in 2-3 months and bring equipment to the follow-up clinic      # ALLERGIC RHINITIS / SEASONAL ALLERGIES / CHRONIC NASAL CONGESTION  - Stop Afrin use   - Seen ENT and was told septoplasty and nasal valve reconstruction would likely significantly improve his symptoms; patient declined surgical intervention and instead opted to switch nasal steroids to Rhinocort     #HTN  BP Readings from Last 1 Encounters:   03/31/25 158/87     - doing well, asymptomatic, denies any headache, blurry vision, chest pain, palpitation, dizziness, lightheadedness, or syncopal episodes  - discussed at length the impact of untreated KAYLAH and BP control  - supportive management: low salt DASH diet (less than 2000 mg sodium intake daily), moderate intensity aerobic exercise at least 30 minutes 5 days per week, reduce stress, quit smoking, limit alcohol, lose weight, and monitor BP once daily  - continue current management and follow-up with PCP     #Overweight  BMI Readings from Last 1 Encounters:   03/31/25 28.62 kg/m²     - Encouraged healthy weight loss via diet and exercise  - Weight loss can help in the long term treatment of KAYLAH.  - Defer management to PCP     All of patient's questions were  answered. He verbalizes understanding and agreement with my assessment and plan.    Disposition    Return to clinic in 6 months

## 2025-03-31 ENCOUNTER — OFFICE VISIT (OUTPATIENT)
Facility: CLINIC | Age: 55
End: 2025-03-31
Payer: COMMERCIAL

## 2025-03-31 VITALS
TEMPERATURE: 97.5 F | OXYGEN SATURATION: 96 % | BODY MASS INDEX: 28.66 KG/M2 | WEIGHT: 172 LBS | SYSTOLIC BLOOD PRESSURE: 158 MMHG | DIASTOLIC BLOOD PRESSURE: 87 MMHG | HEIGHT: 65 IN | HEART RATE: 86 BPM

## 2025-03-31 DIAGNOSIS — J34.89 NASAL OBSTRUCTION: ICD-10-CM

## 2025-03-31 DIAGNOSIS — G47.33 OSA (OBSTRUCTIVE SLEEP APNEA): Primary | ICD-10-CM

## 2025-03-31 DIAGNOSIS — I10 PRIMARY HYPERTENSION: ICD-10-CM

## 2025-03-31 DIAGNOSIS — E66.3 OVERWEIGHT (BMI 25.0-29.9): ICD-10-CM

## 2025-03-31 PROCEDURE — 99214 OFFICE O/P EST MOD 30 MIN: CPT | Performed by: NURSE PRACTITIONER

## 2025-03-31 PROCEDURE — 1036F TOBACCO NON-USER: CPT | Performed by: NURSE PRACTITIONER

## 2025-03-31 PROCEDURE — 3079F DIAST BP 80-89 MM HG: CPT | Performed by: NURSE PRACTITIONER

## 2025-03-31 PROCEDURE — 3008F BODY MASS INDEX DOCD: CPT | Performed by: NURSE PRACTITIONER

## 2025-03-31 PROCEDURE — 3077F SYST BP >= 140 MM HG: CPT | Performed by: NURSE PRACTITIONER

## 2025-03-31 ASSESSMENT — SLEEP AND FATIGUE QUESTIONNAIRES
HOW LIKELY ARE YOU TO NOD OFF OR FALL ASLEEP WHILE LYING DOWN TO REST IN THE AFTERNOON WHEN CIRCUMSTANCES PERMIT: WOULD NEVER DOZE
HOW LIKELY ARE YOU TO NOD OFF OR FALL ASLEEP WHILE SITTING AND READING: WOULD NEVER DOZE
HOW LIKELY ARE YOU TO NOD OFF OR FALL ASLEEP WHILE SITTING QUIETLY AFTER LUNCH WITHOUT ALCOHOL: WOULD NEVER DOZE
HOW LIKELY ARE YOU TO NOD OFF OR FALL ASLEEP IN A CAR, WHILE STOPPED FOR A FEW MINUTES IN TRAFFIC: WOULD NEVER DOZE
ESS-CHAD TOTAL SCORE: 0
HOW LIKELY ARE YOU TO NOD OFF OR FALL ASLEEP WHILE WATCHING TV: WOULD NEVER DOZE
SITING INACTIVE IN A PUBLIC PLACE LIKE A CLASS ROOM OR A MOVIE THEATER: WOULD NEVER DOZE
HOW LIKELY ARE YOU TO NOD OFF OR FALL ASLEEP WHILE SITTING AND TALKING TO SOMEONE: WOULD NEVER DOZE
HOW LIKELY ARE YOU TO NOD OFF OR FALL ASLEEP WHEN YOU ARE A PASSENGER IN A CAR FOR AN HOUR WITHOUT A BREAK: WOULD NEVER DOZE

## 2025-06-23 NOTE — PROGRESS NOTES
"   Patient: Edgar Covarrubias III  : 1970 AGE: 54 y.o. SEX:male   MRN: [id number]   Provider: MANJULA ADAM MA     Location [facility]   Service Date: 3/31/2025   PCP: Gt Harris DO   Referred by: No ref. provider found          [facility]  Follow Up Visit Note      HISTORY OF PRESENT ILLNESS     Edgar Covarrubias III is a 54 y.o. male with a h/o KAYLAH and deviated septum, HLD who presents to [facility].    3/31/25: Doing better with PAP usage since last visit. Now using most nights with positive benefit. He is bothered by front tubing getting in the way and would like to try top of head tubing style. He has seen ENT. Now using Rhinocort a couple times per week which he has found helpful. Plans to follow up with ENT- plastics for possible surgical correction. ---> Airtouch N30i, sample mask provided       24: Has been try to do better with CPAP but still inconsistent with use. Recently went out of town for [employer] and did not take his machine with him. Other nights he just doesn't want to bother to put mask on. Does not notice benefit with use, but does not think it makes sleep any worse either. No longer feeling air hunger on initiation. Comfortable with NP mask and pressure. Continues to report chronic nasal congestion which is his biggest complaint, always feels like \"one side of nose is blocked\". Not consistent with nasal spray or antihistamine use. Admits to grabbing \"whataver nasal spray is nearby\" when he needs it. ----> Refer to ENT     10/30/24: First follow up since starting PAP therapy. Still struggling with use, feels air hunger on initiation, has not made part of nightly routine, and nasal congestion. Was started on flonase but stopped use as he does not like smell so now using Afrin 2x/week. Reports TST 6 hours/night on average. Comfortable with NP mask. PERCEIVED BENEFITS OF PAP: refreshing sleep. ----> " Adjust from 5-15 to 6-15, turned RAMP off. Start Sensimist (stop Afrin use)       7/16/24: NPV with concerns of KAYLAH management, found on recent sleep study. Reports loud snoring, witnessed apneas, night time awakenings, un refreshed sleep. ----> APAP 5-15 CWP ordered via [facility].    SLEEP STUDY HISTORY (personally reviewed raw data such as interpretation report, data sheet, hypnogram, and titration table if available and applicable)  -HSAT 6/17/24: showing severe KAYLAH with MARTA 39, DANIAL 12, SpO2 ariana 78% SpO2 <=88% for 34 min    ESS:      REVIEW OF SYSTEMS     All other systems have been reviewed and are negative.    ALLERGIES     Allergies   Allergen Reactions    Bee Pollen Runny nose       MEDICATIONS     Current Outpatient Medications   Medication Sig Dispense Refill    acetaminophen (Tylenol) 500 mg tablet Take by mouth.      aspirin-acetaminophen-caffeine (Excedrin Migraine) 250-250-65 mg tablet Take 1 tablet by mouth every 6 hours if needed for headaches.      budesonide (Rhinocort AQ) 32 mcg/actuation nasal spray Administer 1-2 sprays into each nostril once daily. 8.6 g 3    ibuprofen 800 mg tablet Take 1 tablet (800 mg) by mouth every 8 hours if needed.      rosuvastatin (Crestor) 5 mg tablet Take 1 tablet (5 mg) by mouth once daily. 30 tablet 0     No current facility-administered medications for this visit.       PAST HISTORIES     PERTINENT PAST MEDICAL HISTORY: See HPI    PERTINENT PAST SURGICAL HISTORY for Sleep Medicine:  Nose surgery when young     PERTINENT FAMILY HISTORY for Sleep Medicine:  sleep apnea on PAP- father    PERTINENT SOCIAL HISTORY:  He  reports that he has quit smoking. His smoking use included cigarettes. He has never used smokeless tobacco. He reports current alcohol use of about 28.0 standard drinks of alcohol per week. He reports that he does not use drugs. He currently lives with spouse.    Active Problems, Allergy List, Medication List, and PMH/PSH/FH/Social Hx have been reviewed and  "reconciled in chart. No significant changes unless documented in the pertinent chart section. Updates made when necessary.     PHYSICAL EXAM     VITAL SIGNS: There were no vitals taken for this visit.    CURRENT WEIGHT:   There were no vitals filed for this visit.      PREVIOUS WEIGHTS:  Wt Readings from Last 3 Encounters:   03/31/25 78 kg (172 lb)   01/30/25 74.8 kg (165 lb)   01/28/25 80.4 kg (177 lb 3.2 oz)     Constitutional: Alert and oriented, cooperative, no obvious distress   HEENT: Non icteric or anemic, EOM WNL bilaterally   Neck: Supple, no JVD, no goiter, no adenopathy, no rigidity     RESULTS/DATA     No results found for: \"IRON\", \"TRANSFERRIN\", \"IRONSAT\", \"TIBC\", \"FERRITIN\"    Bicarbonate   Date Value Ref Range Status   06/03/2024 31 21 - 32 mmol/L Final       ASSESSMENT/PLAN     Mr. Covarrubias is a 54 y.o. male and He was referred to the [facility] for evaluation of KAYLAH    Problem List, Orders, Assessment, Recommendations:    # KAYLAH, severe  - HSAT 6/17/24: showing severe KAYLAH with MARTA 39, DANIAL 12, SpO2 ariana 78% SpO2 <=88% for 34 min    3/31/25:  - Fit with Airtouch N30i, sample mask provided   - Encouraged to continue increasing use and duration    12/30/24:  - again with poor compliance but using for longer hours on nights used, residual AHI at goal with use (2.3)----> emphasized importance of increasing usage and duration    10/30/24:  - Retrieved and personally reviewed recent PAP adherence download data today. See HPI.  -  poor compliance to PAP therapy, residual AHI at goal with use (1.9) ----> emphasized importance of increasing usage and duration  -  Adjusted from 5-15 to 6-15, turned RAMP off. Changes made in [facility] today   -  DME- [facility]  - diet, exercise, and weight loss were emphasized today in clinic, as were non-supine sleep, avoiding alcohol in the late evening, and driving or operating heavy machinery when sleepy. Patient verbalized understanding. "     7/16/24:  - will start APAP 5-15 cwp via DME- [facility] (please expedite set up due to KAYLAH severity)   - Recommend starting with nasal or NP interface, may add chin strap or mouth tape   -Sleep apnea, PAP therapy education as well as the tips to be successful with PAP treatment was provided at length in clinic today. Patient verbalized understanding.  - Discussed 30-day mask guarantee and insurance requirement regarding PAP compliance and follow-up.   -Diet, exercise, and weight loss were emphasized today in clinic, as were non-supine sleep, avoiding alcohol in the late evening, and driving or operating heavy machinery when sleepy.   - patient will follow-up in 2-3 months and bring equipment to the follow-up clinic      # ALLERGIC RHINITIS / SEASONAL ALLERGIES / CHRONIC NASAL CONGESTION  - Stop Afrin use   - Seen ENT and was told septoplasty and nasal valve reconstruction would likely significantly improve his symptoms; patient declined surgical intervention and instead opted to switch nasal steroids to Rhinocort     #HTN  BP Readings from Last 1 Encounters:   03/31/25 158/87     - doing well, asymptomatic, denies any headache, blurry vision, chest pain, palpitation, dizziness, lightheadedness, or syncopal episodes  - discussed at length the impact of untreated KAYLAH and BP control  - supportive management: low salt DASH diet (less than 2000 mg sodium intake daily), moderate intensity aerobic exercise at least 30 minutes 5 days per week, reduce stress, quit smoking, limit alcohol, lose weight, and monitor BP once daily  - continue current management and follow-up with PCP     #Overweight  BMI Readings from Last 1 Encounters:   03/31/25 28.62 kg/m²     - Encouraged healthy weight loss via diet and exercise  - Weight loss can help in the long term treatment of KAYLAH.  - Defer management to PCP     All of patient's questions were answered. He verbalizes understanding and agreement with my assessment and  plan.    Disposition    Return to clinic in 6 months

## 2025-06-30 ENCOUNTER — APPOINTMENT (OUTPATIENT)
Facility: CLINIC | Age: 55
End: 2025-06-30
Payer: COMMERCIAL

## 2025-09-04 ENCOUNTER — APPOINTMENT (OUTPATIENT)
Facility: CLINIC | Age: 55
End: 2025-09-04
Payer: COMMERCIAL

## 2025-09-04 ASSESSMENT — PAIN SCALES - GENERAL: PAINLEVEL_OUTOF10: 0-NO PAIN

## (undated) DEVICE — STRAP, ARM BOARD, 32 X 1.5

## (undated) DEVICE — SUTURE, VICRYL PLUS 3-0, SH, 27IN

## (undated) DEVICE — SYRINGE, CONTROL, ANGIOGRAPHIC, FIXED MALE LUER, 10 CC

## (undated) DEVICE — NEEDLE, SAFETY, 25 GA X 1.5 IN

## (undated) DEVICE — SUTURE, VICRYL, 3-0, 27 IN, CT-1, UNDYED

## (undated) DEVICE — DRAPE SHEET, UTILITY, 26 X 15, W/ TAPE, STERILE

## (undated) DEVICE — DRAPE, SHEET, XL

## (undated) DEVICE — SUTURE, VICRYL, 2-0, 27 IN, BR/SH 27, VIOLET

## (undated) DEVICE — TOWEL PACK, STERILE, 16X24, XRAY DETECTABLE, BLUE, 4/PK

## (undated) DEVICE — CAUTERY, PENCIL, PUSH BUTTON, SMOKE EVAC, 70MM

## (undated) DEVICE — ELECTRODE, ELECTROSURGICAL, BLADE, INSULATED, ENT/IMA, STERILE

## (undated) DEVICE — MANIFOLD, 4 PORT NEPTUNE STANDARD

## (undated) DEVICE — SPONGE, DISSECTOR, PEANUT, 3/8, STERILE 5 FOAM HOLDER"

## (undated) DEVICE — SUTURE, VICRYL, 0, 18 IN, TIE, VIOLET

## (undated) DEVICE — SUTURE, VICRYL, 3-0, 18 IN, VIOLEET

## (undated) DEVICE — SOLUTION, SCRUB EXIDINE, 4% CHG, 8 OZ

## (undated) DEVICE — DRAPE, SHEET, LAPAROTOMY, W/ISO-BAC, W/ARMBOARD COVERS, 98 X 122 IN, DISPOSABLE, LF, STERILE

## (undated) DEVICE — PREP, SCRUB, SKIN, FOAM, HIBICLENS, 4 OZ

## (undated) DEVICE — STRAP, VELCRO, BODY, 4 X 60IN, NS

## (undated) DEVICE — DRAPE, SHEET, ENDOSCOPY, GENERAL, FENESTRATED, ARMBOARD COVER, 98 X 123.5 IN, DISPOSABLE, LF, STERILE

## (undated) DEVICE — ADHESIVE, SKIN, DERMABOND ADVANCED, 15CM, PEN-STYLE

## (undated) DEVICE — CUP, MEDICINE, GRADUATED, 2 OZ, PLASTIC, DISP, LF

## (undated) DEVICE — SUTURE, MONOCRYL, 4-0, 27 IN, PS-2, UNDYED

## (undated) DEVICE — Device

## (undated) DEVICE — SUTURE, PDS II, 0, 27 IN, CT-2, VIOLET

## (undated) DEVICE — GLOVE, SURGICAL, PROTEXIS PI , 7.5, PF, LF

## (undated) DEVICE — TRAY, DRY PREP, PREMIUM